# Patient Record
Sex: FEMALE | Race: ASIAN | NOT HISPANIC OR LATINO | URBAN - METROPOLITAN AREA
[De-identification: names, ages, dates, MRNs, and addresses within clinical notes are randomized per-mention and may not be internally consistent; named-entity substitution may affect disease eponyms.]

---

## 2018-05-17 ENCOUNTER — OFFICE VISIT (OUTPATIENT)
Dept: INTERNAL MEDICINE | Facility: CLINIC | Age: 28
End: 2018-05-17
Payer: COMMERCIAL

## 2018-05-17 VITALS
HEART RATE: 78 BPM | WEIGHT: 124.3 LBS | OXYGEN SATURATION: 98 % | RESPIRATION RATE: 16 BRPM | HEIGHT: 64 IN | BODY MASS INDEX: 21.22 KG/M2 | TEMPERATURE: 98.2 F | DIASTOLIC BLOOD PRESSURE: 60 MMHG | SYSTOLIC BLOOD PRESSURE: 100 MMHG

## 2018-05-17 DIAGNOSIS — S83.92XA SPRAIN OF LEFT KNEE, UNSPECIFIED LIGAMENT, INITIAL ENCOUNTER: Primary | ICD-10-CM

## 2018-05-17 PROCEDURE — 99213 OFFICE O/P EST LOW 20 MIN: CPT | Performed by: INTERNAL MEDICINE

## 2018-05-17 NOTE — MR AVS SNAPSHOT
"              After Visit Summary   2018    Kasie Ennis    MRN: [id number]           Patient Information     Date Of Birth          1990        Visit Information        Provider Department      2018 3:00 PM Saurabh Herzog MD [facility]        Today's Diagnoses     Sprain of left knee, unspecified ligament, initial encounter    -  1       Follow-ups after your visit        Who to contact     If you have questions or need follow up information about today's clinic visit or your schedule please contact [facility] directly at [phone].  Normal or non-critical lab and imaging results will be communicated to you by MyChart, letter or phone within 4 business days after the clinic has received the results. If you do not hear from us within 7 days, please contact the clinic through Cactushart or phone. If you have a critical or abnormal lab result, we will notify you by phone as soon as possible.  Submit refill requests through Wandera or call your pharmacy and they will forward the refill request to us. Please allow 3 business days for your refill to be completed.          Additional Information About Your Visit        MyChart Information     Wandera lets you send messages to your doctor, view your test results, renew your prescriptions, schedule appointments and more. To sign up, go to [email or web address] . Click on \"Log in\" on the left side of the screen, which will take you to the Welcome page. Then click on \"Sign up Now\" on the right side of the page.     You will be asked to enter the access code listed below, as well as some personal information. Please follow the directions to create your username and password.     Your access code is: [email or web address]  Expires: 8/15/2018  3:49 PM     Your access code will  in 90 days. If you need help or a new code, please call your [facility] or [phone].        Care EveryWhere ID     This is " "your Care EveryWhere ID. This could be used by other organizations to access your [facility] medical records  [id number]        Your Vitals Were     Pulse Temperature Respirations Height Last Period Pulse Oximetry    78 98.2  F (36.8  C) (Oral) 16 5' 4\" (1.626 m) 04/24/2018 (Approximate) 98%    Breastfeeding? BMI (Body Mass Index)                No 21.34 kg/m2           Blood Pressure from Last 3 Encounters:   05/17/18 100/60    Weight from Last 3 Encounters:   05/17/18 124 lb 4.8 oz (56.4 kg)              Today, you had the following     No orders found for display       Primary Care Provider Fax #    Physician No Ref-Primary [phone]       No address on file        Equal Access to Services     ANASTASIIA JIMENEZ : Rupert Herring, waaxda luqadaha, qaybta kaalmada adeegyada, darin hartmann . So [facility] [phone].    ATENCIÓN: Si habla español, tiene a hogan disposición servicios gratuitos de asistencia lingüística. Llame al [phone].    We comply with applicable federal civil rights laws and [place] laws. We do not discriminate on the basis of race, color, national origin, age, disability, sex, sexual orientation, or gender identity.            Thank you!     Thank you for choosing [facility]  for your care. Our goal is always to provide you with excellent care. Hearing back from our patients is one way we can continue to improve our services. Please take a few minutes to complete the written survey that you may receive in the mail after your visit with us. Thank you!             Your Updated Medication List - Protect others around you: Learn how to safely use, store and throw away your medicines at www.disposemymeds.org.      Notice  As of 5/17/2018  3:49 PM    You have not been prescribed any medications.      "

## 2018-05-17 NOTE — PROGRESS NOTES
"  SUBJECTIVE:   Kasie Ennis is a 27 year old female who presents to clinic today for the following health issues:      Musculoskeletal problem/pain      Duration: x6 days    Description  Location: Left knee    Intensity:  If resting no pain, when she moves her leg is mild, and walking/standing it is moderate    Accompanying signs and symptoms: feeling pressure in calve     History  Previous similar problem: no   Previous evaluation:  none    Precipitating or alleviating factors:  Trauma or overuse: no, Recently started running  Aggravating factors include:     Therapies tried and outcome: ice and knee elastic band with no relief          Problem list and histories reviewed & adjusted, as indicated.  Additional history:     Patient recently started running, has noticed intermittent left knee pain since then.  Pain seems lateral, and is worse after prolonged standing.  Pain is certainly not functionally debilitating, does not request any medication specifically.  She denies any swelling.    Reviewed and updated as needed this visit by clinical staff       Reviewed and updated as needed this visit by Provider         ROS:  Constitutional, HEENT, cardiovascular, pulmonary, GI, , musculoskeletal, neuro, skin, endocrine and psych systems are negative, except as otherwise noted.    OBJECTIVE:     /60  Pulse 78  Temp 98.2  F (36.8  C) (Oral)  Resp 16  Ht 5' 4\" (1.626 m)  Wt 124 lb 4.8 oz (56.4 kg)  LMP 04/24/2018 (Approximate)  SpO2 98%  Breastfeeding? No  BMI 21.34 kg/m2  Body mass index is 21.34 kg/(m^2).  GENERAL: healthy, alert and no distress  NECK: no adenopathy, no asymmetry, masses, or scars and thyroid normal to palpation  RESP: lungs clear to auscultation - no rales, rhonchi or wheezes  CV: regular rate and rhythm, normal S1 S2, no S3 or S4, no murmur, click or rub, no peripheral edema and peripheral pulses strong  ABDOMEN: soft, nontender, no hepatosplenomegaly, no masses and bowel sounds " normal  MS: Left knee reveals no laxity with stress in all 4 quadrants.  Negative Lachman's, negative Gaviota's.  Some point tenderness along lateral joint line.        ASSESSMENT/PLAN:     1. Sprain of left knee, unspecified ligament, initial encounter  No evidence of significant derangement on exam.  No need for x-ray today.  Reassurance, ibuprofen as needed, continue to avoid running for the next few weeks, use ice and support brace as needed.        Saurabh Herzog MD  [facility]

## 2019-02-05 ENCOUNTER — TELEPHONE (OUTPATIENT)
Dept: INTERNAL MEDICINE | Facility: CLINIC | Age: 29
End: 2019-02-05

## 2019-02-05 NOTE — TELEPHONE ENCOUNTER
Panel Management Review  [facility]    There is no problem list on file for this patient.        Patient is due/failing the following:   PREVENTION AND SCREENING       PAP  CARDIOVASCULAR and RENAL  DIABETES  DEPRESSION  ASTHMA  MIGRAINE  COPD  HEART FAILURE  CKD    Action needed:   Patient needs office visit with MD (no labs needed)    Type of outreach:    Sent letter.    Maddie Mendez

## 2019-02-05 NOTE — LETTER
[facility]  [address] [place] [address]  [phone]  February 5, 2019    Kasie Ennis  [address]   [place] [facility] [address]    Dear Kasie,    We care about your health and based on a review of your medical records, recommend the the following, to better manage your health:      You are in particular need of attention regarding:  -Cervical Cancer Screening    I am recommending that you:     -schedule a PAP SMEAR EXAM which is due.  Please disregard this reminder if you have had this exam elsewhere within the last year.  It would be helpful for us to have a copy of your recent pap smear report in our file so that we can best coordinate your care.    If you are under/uninsured, we recommend you contact the [facility]. They offer pap smears at no charge or on a sliding fee charge. You can schedule with them at [phone]. Please have them send us the results.      Here is a list of Health Maintenance topics that are due now or due soon:  Health Maintenance Due   Topic Date Due     HIV SCREEN (SYSTEM ASSIGNED)  08/30/2006     Pap Smear - every 3 years  08/30/2009     Diptheria Tetanus Pertussis (DTAP/TDAP/TD) Vaccine (1 - Tdap) 08/30/2013     Flu Vaccine (1) 09/01/2018       Please call us at [phone] or [phone] (or use [employer]) to address the above recommendations.     Thank you for trusting [facility].  We appreciate the opportunity to serve you and look forward to supporting your healthcare needs in the future.    If you have (or plan to have) any of these tests done at a facility other than a [facility] or a [facility], please have the results from these tests sent to your primary physician at [facility].    Healthy Regards,    Glen Herzog MD/Maddie Mendez [facility]

## 2020-01-06 ENCOUNTER — OFFICE VISIT (OUTPATIENT)
Dept: FAMILY MEDICINE | Facility: CLINIC | Age: 30
End: 2020-01-06
Payer: COMMERCIAL

## 2020-01-06 VITALS — TEMPERATURE: 98.7 F | SYSTOLIC BLOOD PRESSURE: 100 MMHG | DIASTOLIC BLOOD PRESSURE: 62 MMHG

## 2020-01-06 DIAGNOSIS — Z11.59 ENCOUNTER FOR SCREENING FOR OTHER VIRAL DISEASES: ICD-10-CM

## 2020-01-06 DIAGNOSIS — Z71.84 TRAVEL ADVICE ENCOUNTER: Primary | ICD-10-CM

## 2020-01-06 LAB
ABO + RH BLD: NORMAL
ABO + RH BLD: NORMAL
SPECIMEN EXP DATE BLD: NORMAL

## 2020-01-06 PROCEDURE — 86900 BLOOD TYPING SEROLOGIC ABO: CPT | Mod: GA | Performed by: NURSE PRACTITIONER

## 2020-01-06 PROCEDURE — 90686 IIV4 VACC NO PRSV 0.5 ML IM: CPT | Performed by: NURSE PRACTITIONER

## 2020-01-06 PROCEDURE — 90691 TYPHOID VACCINE IM: CPT | Mod: GA | Performed by: NURSE PRACTITIONER

## 2020-01-06 PROCEDURE — 86787 VARICELLA-ZOSTER ANTIBODY: CPT | Mod: GA | Performed by: NURSE PRACTITIONER

## 2020-01-06 PROCEDURE — 86735 MUMPS ANTIBODY: CPT | Mod: GA | Performed by: NURSE PRACTITIONER

## 2020-01-06 PROCEDURE — 87340 HEPATITIS B SURFACE AG IA: CPT | Mod: GA | Performed by: NURSE PRACTITIONER

## 2020-01-06 PROCEDURE — 99403 PREV MED CNSL INDIV APPRX 45: CPT | Mod: 25 | Performed by: NURSE PRACTITIONER

## 2020-01-06 PROCEDURE — 90632 HEPA VACCINE ADULT IM: CPT | Mod: GA | Performed by: NURSE PRACTITIONER

## 2020-01-06 PROCEDURE — 86765 RUBEOLA ANTIBODY: CPT | Mod: GA | Performed by: NURSE PRACTITIONER

## 2020-01-06 PROCEDURE — 90471 IMMUNIZATION ADMIN: CPT | Performed by: NURSE PRACTITIONER

## 2020-01-06 PROCEDURE — 90715 TDAP VACCINE 7 YRS/> IM: CPT | Mod: GA | Performed by: NURSE PRACTITIONER

## 2020-01-06 PROCEDURE — 90472 IMMUNIZATION ADMIN EACH ADD: CPT | Mod: GA | Performed by: NURSE PRACTITIONER

## 2020-01-06 PROCEDURE — 86901 BLOOD TYPING SEROLOGIC RH(D): CPT | Mod: GA | Performed by: NURSE PRACTITIONER

## 2020-01-06 PROCEDURE — 36415 COLL VENOUS BLD VENIPUNCTURE: CPT | Mod: GA | Performed by: NURSE PRACTITIONER

## 2020-01-06 PROCEDURE — 86762 RUBELLA ANTIBODY: CPT | Mod: GA | Performed by: NURSE PRACTITIONER

## 2020-01-06 PROCEDURE — 86706 HEP B SURFACE ANTIBODY: CPT | Mod: GA | Performed by: NURSE PRACTITIONER

## 2020-01-06 RX ORDER — ACETAZOLAMIDE 125 MG/1
TABLET ORAL
Qty: 12 TABLET | Refills: 0 | Status: ON HOLD | OUTPATIENT
Start: 2020-01-06 | End: 2023-02-17

## 2020-01-06 NOTE — PROGRESS NOTES
Nurse Note      Itinerary:  [place]      Departure Date: 1/10/20      Return Date: 1/18/20      Length of Trip 8 days      Reason for Travel: Tourism           Urban or rural: both      Accommodations: Hotel        IMMUNIZATION HISTORY  Have you received any immunizations within the past 4 weeks?  No  Have you ever fainted from having your blood drawn or from an injection?  No  Have you ever had a fever reaction to vaccination?  No  Have you ever had any bad reaction or side effect from any vaccination?  No  Have you ever had hepatitis A or B vaccine?  unsure  Do you live (or work closely) with anyone who has AIDS, an AIDS-like condition, any other immune disorder or who is on chemotherapy for cancer?  No  Do you have a family history of immunodeficiency?  No  Have you received any injection of immune globulin or any blood products during the past 12 months?  No    Patient roomed by Travis Lee  Kasie Ennis is a 29 year old female seen today alone for counsultation for international travel to the stated countries.   Patient will be departing in  3 day(s) and  traveling with spouse.      Patient itinerary :  [place] [facility] [place] [facility] > [place] ( 10,000 ft )> [facility]  region  which presents risk for food borne illnesses and motor vehicle accidents. exposure.      Patient's activities will include sightseeing, hiking and high altitude exposure.    Patient[place] country of birth is [place] and to [place] in [place]    Special medical concerns: low HGB  Pre-travel questionnaire was completed by patient and reviewed by provider.     Vitals: /62 (BP Location: Left arm)   Temp 98.7  F (37.1  C) (Oral)   LMP 12/28/2019   BMI= There is no height or weight on file to calculate BMI.    EXAM:  General:  Well-nourished, well-developed in no acute distress.  Appears to be stated age, interacts appropriately and expresses understanding of information given to  patient.    No current outpatient medications on file.     There is no problem list on file for this patient.    No Known Allergies      Immunizations discussed include:   Hepatitis A:  Ordered/given today, risks, benefits and side effects reviewed  Hepatitis B: Titers drawn  Influenza: Ordered/given today, risks, benefits and side effects reviewed  Typhoid: Ordered/given today, risks, benefits and side effects reviewed  Rabies: Declined  reviewed managment of a animal bite or scratch (washing wound, seek medical care within 24 hours for post exposure prophylaxis ) and Insufficient time to vaccinate  Yellow Fever: Not indicated  Japanese Encephalitis: Not indicated  Meningococcus: Not indicated  Tetanus/Diphtheria: Ordered/given today, risks, benefits and side effects reviewed  Measles/Mumps/Rubella: Titers drawn  Cholera: Not needed  Polio: Up to date  Pneumococcal: Under age of 65  Varicella: Titers drawn  Zostavax:  Not indicated  Shingrix: Not indicated  HPV:  Not indicated  TB:  Low risk     Altitude Exposure on this trip: no  Past tolerance to Altitude: na    ASSESSMENT/PLAN:    ICD-10-CM    1. Travel advice encounter Z71.84 acetaZOLAMIDE (DIAMOX) 125 MG tablet     ABO and Rh   2. Encounter for screening for other viral diseases Z11.59 Hepatitis B Surface Antibody     Hepatitis B surface antigen     Rubeola Antibody IgG     Rubella Antibody IgG Quantitative     Mumps Immune Status, IgG     Varicella Zoster Virus Antibody IgG     Mumps Immune Status, IgG     I have reviewed general recommendations for safe travel   including: food/water precautions, insect precautions, safer sex   practices given high prevalence of Zika, HIV and other STDs,   roadway safety. Educational materials and [employer] report provided.    Malaraia prophylaxis recommended: none  Symptomatic treatment for traveler's diarrhea: azithromycin  Altitude illness prevention and treatment: Diamox prescription given with instructions on use and  education provided on Acute altitude illness recognition and prevention.    Advised patient to begin multivitiamins with Fe. Has follow up planned for HGB       Evacuation insurance advised and resources were provided to patient.    Total visit time 45 minutes  with over 50% of time spent counseling patient as detailed above.    Ynes Guadalupe CNP

## 2020-01-06 NOTE — NURSING NOTE
"Chief Complaint   Patient presents with     [facility]     initial /62 (BP Location: Left arm)   Temp 98.7  F (37.1  C) (Oral)   LMP 12/28/2019  Estimated body mass index is 21.34 kg/m  as calculated from the following:    Height as of 5/17/18: 1.626 m (5' 4\").    Weight as of 5/17/18: 56.4 kg (124 lb 4.8 oz).  BP completed using cuff size: regular.  L  arm      Health Maintenance that is potentially due pending provider review:  NONE    n/a    Travis Freedman ma  "

## 2020-01-06 NOTE — PATIENT INSTRUCTIONS
Today January 6, 2020 you received the    Flu Vaccine    Hepatitis A Vaccine - Please return on 7/4/20 or later for your 2nd and final dose.    Tetanus (Tdap) Vaccine    Typhoid - injectable. This vaccine is valid for two years.   .    These appointments can be made as a NURSE ONLY visit.    **It is very important for the vaccinations to be given on the scheduled day(s), this helps ensure you receive the full effectiveness of the vaccine.**    Please call [facility] with any questions [phone]    Thank you for visiting [facility]

## 2020-01-07 LAB
HBV SURFACE AB SERPL IA-ACNC: 263.63 M[IU]/ML
HBV SURFACE AG SERPL QL IA: NONREACTIVE
MEV IGG SER QL IA: 3.9 AI (ref 0–0.8)
MUV IGG SER QL IA: 0.3 AI (ref 0–0.8)
RUBV IGG SERPL IA-ACNC: 9 IU/ML
VZV IGG SER QL IA: <0.2 AI (ref 0–0.8)

## 2020-01-08 DIAGNOSIS — Z23 NEED FOR VARICELLA VACCINE: ICD-10-CM

## 2020-01-08 DIAGNOSIS — Z23 NEED FOR MMR VACCINE: Primary | ICD-10-CM

## 2020-01-09 ENCOUNTER — ALLIED HEALTH/NURSE VISIT (OUTPATIENT)
Dept: NURSING | Facility: CLINIC | Age: 30
End: 2020-01-09
Payer: COMMERCIAL

## 2020-01-09 DIAGNOSIS — Z23 NEED FOR VACCINATION: Primary | ICD-10-CM

## 2020-01-09 PROCEDURE — 90716 VAR VACCINE LIVE SUBQ: CPT

## 2020-01-09 PROCEDURE — 90707 MMR VACCINE SC: CPT

## 2020-01-09 PROCEDURE — 90472 IMMUNIZATION ADMIN EACH ADD: CPT

## 2020-01-09 PROCEDURE — 99207 ZZC NO CHARGE LOS: CPT

## 2020-01-09 PROCEDURE — 90471 IMMUNIZATION ADMIN: CPT

## 2020-01-09 NOTE — NURSING NOTE
Prior to immunization administration, verified patients identity using patient s name and date of birth. Please see Immunization Activity for additional information.     Screening Questionnaire for Adult Immunization    Are you sick today?   No   Do you have allergies to medications, food, a vaccine component or latex?   No   Have you ever had a serious reaction after receiving a vaccination?   No   Do you have a long-term health problem with heart, lung, kidney, or metabolic disease (e.g., diabetes), asthma, a blood disorder, no spleen, complement component deficiency, a cochlear implant, or a spinal fluid leak?  Are you on long-term aspirin therapy?   No   Do you have cancer, leukemia, HIV/AIDS, or any other immune system problem?   No   Do you have a parent, brother, or sister with an immune system problem?   No   In the past 3 months, have you taken medications that affect  your immune system, such as prednisone, other steroids, or anticancer drugs; drugs for the treatment of rheumatoid arthritis, Crohn s disease, or psoriasis; or have you had radiation treatments?   No   Have you had a seizure, or a brain or other nervous system problem?   No   During the past year, have you received a transfusion of blood or blood    products, or been given immune (gamma) globulin or antiviral drug?   No   For women: Are you pregnant or is there a chance you could become       pregnant during the next month?   No   Have you received any vaccinations in the past 4 weeks?   Yes     Immunization questionnaire was positive for at least one answer.  Notified Ynes Guadalupe CNP.        Per orders of Ynes Guadalupe CNP, injection of MMR and Varicella given by Maddie Aleman MA. Patient instructed to remain in clinic for 15 minutes afterwards, and to report any adverse reaction to me immediately.  Patient verified.     Screening performed by Maddie Aleman MA on 1/9/2020 at 4:29 PM.

## 2022-09-06 ENCOUNTER — LAB REQUISITION (OUTPATIENT)
Dept: LAB | Facility: CLINIC | Age: 32
End: 2022-09-06
Payer: COMMERCIAL

## 2022-09-06 DIAGNOSIS — Z3A.16 16 WEEKS GESTATION OF PREGNANCY: ICD-10-CM

## 2022-09-06 PROCEDURE — 82105 ALPHA-FETOPROTEIN SERUM: CPT | Mod: ORL | Performed by: OBSTETRICS & GYNECOLOGY

## 2022-09-07 LAB
# FETUSES US: NORMAL
AFP MOM SERPL: 1.06
AFP SERPL-MCNC: 41 NG/ML
AGE - REPORTED: 34.5 YR
CURRENT SMOKER: NO
FAMILY MEMBER DISEASES HX: NO
GA METHOD: NORMAL
GA: NORMAL WK
IDDM PATIENT QL: NO
INTEGRATED SCN PATIENT-IMP: NORMAL
SPECIMEN DRAWN SERPL: NORMAL

## 2022-11-22 ENCOUNTER — LAB REQUISITION (OUTPATIENT)
Dept: LAB | Facility: CLINIC | Age: 32
End: 2022-11-22
Payer: COMMERCIAL

## 2022-11-22 ENCOUNTER — LAB REQUISITION (OUTPATIENT)
Dept: LAB | Facility: CLINIC | Age: 32
End: 2022-11-22

## 2022-11-22 DIAGNOSIS — Z36.89 ENCOUNTER FOR OTHER SPECIFIED ANTENATAL SCREENING: ICD-10-CM

## 2022-11-22 LAB
HEPATITIS B SURFACE ANTIGEN (EXTERNAL): NEGATIVE
HGB BLD-MCNC: 11.7 G/DL (ref 11.7–15.7)
HIV1+2 AB SERPL QL IA: NEGATIVE
RUBELLA ANTIBODY IGG (EXTERNAL): NORMAL
TREPONEMA PALLIDUM ANTIBODY (EXTERNAL): NONREACTIVE

## 2022-11-22 PROCEDURE — 86780 TREPONEMA PALLIDUM: CPT | Performed by: OBSTETRICS & GYNECOLOGY

## 2022-11-22 PROCEDURE — 85018 HEMOGLOBIN: CPT | Mod: ORL | Performed by: OBSTETRICS & GYNECOLOGY

## 2022-11-22 PROCEDURE — 82950 GLUCOSE TEST: CPT | Mod: ORL | Performed by: OBSTETRICS & GYNECOLOGY

## 2022-11-23 LAB
GLUCOSE 1H P 50 G GLC PO SERPL-MCNC: 99 MG/DL (ref 70–129)
T PALLIDUM AB SER QL: NONREACTIVE

## 2023-01-14 ENCOUNTER — HOSPITAL ENCOUNTER (OUTPATIENT)
Facility: CLINIC | Age: 33
End: 2023-01-14
Admitting: OBSTETRICS & GYNECOLOGY
Payer: COMMERCIAL

## 2023-01-14 ENCOUNTER — HOSPITAL ENCOUNTER (OUTPATIENT)
Facility: CLINIC | Age: 33
Discharge: HOME | End: 2023-01-14
Attending: OBSTETRICS & GYNECOLOGY | Admitting: OBSTETRICS & GYNECOLOGY
Payer: COMMERCIAL

## 2023-01-14 VITALS
SYSTOLIC BLOOD PRESSURE: 102 MMHG | TEMPERATURE: 98 F | RESPIRATION RATE: 16 BRPM | DIASTOLIC BLOOD PRESSURE: 60 MMHG | HEART RATE: 76 BPM

## 2023-01-14 PROCEDURE — 59025 FETAL NON-STRESS TEST: CPT

## 2023-01-14 PROCEDURE — G0463 HOSPITAL OUTPT CLINIC VISIT: HCPCS | Mod: 25

## 2023-01-14 RX ORDER — PROCHLORPERAZINE 25 MG
25 SUPPOSITORY, RECTAL RECTAL EVERY 12 HOURS PRN
Status: DISCONTINUED | OUTPATIENT
Start: 2023-01-14 | End: 2023-01-14 | Stop reason: HOSPADM

## 2023-01-14 RX ORDER — METOCLOPRAMIDE HYDROCHLORIDE 5 MG/ML
10 INJECTION INTRAMUSCULAR; INTRAVENOUS EVERY 6 HOURS PRN
Status: DISCONTINUED | OUTPATIENT
Start: 2023-01-14 | End: 2023-01-14 | Stop reason: HOSPADM

## 2023-01-14 RX ORDER — PROCHLORPERAZINE MALEATE 5 MG
10 TABLET ORAL EVERY 6 HOURS PRN
Status: DISCONTINUED | OUTPATIENT
Start: 2023-01-14 | End: 2023-01-14 | Stop reason: HOSPADM

## 2023-01-14 RX ORDER — ONDANSETRON 4 MG/1
4 TABLET, ORALLY DISINTEGRATING ORAL EVERY 6 HOURS PRN
Status: DISCONTINUED | OUTPATIENT
Start: 2023-01-14 | End: 2023-01-14 | Stop reason: HOSPADM

## 2023-01-14 RX ORDER — ONDANSETRON 2 MG/ML
4 INJECTION INTRAMUSCULAR; INTRAVENOUS EVERY 6 HOURS PRN
Status: DISCONTINUED | OUTPATIENT
Start: 2023-01-14 | End: 2023-01-14 | Stop reason: HOSPADM

## 2023-01-14 RX ORDER — METOCLOPRAMIDE 10 MG/1
10 TABLET ORAL EVERY 6 HOURS PRN
Status: DISCONTINUED | OUTPATIENT
Start: 2023-01-14 | End: 2023-01-14 | Stop reason: HOSPADM

## 2023-01-14 RX ORDER — VITAMIN A ACETATE, .BETA.-CAROTENE, ASCORBIC ACID, CHOLECALCIFEROL, .ALPHA.-TOCOPHEROL ACETATE, DL-, THIAMINE MONONITRATE, RIBOFLAVIN, NIACINAMIDE, PYRIDOXINE HYDROCHLORIDE, FOLIC ACID, CYANOCOBALAMIN, CALCIUM CARBONATE, FERROUS FUMARATE, ZINC OXIDE, AND CUPRIC OXIDE 2000; 2000; 120; 400; 22; 1.84; 3; 20; 10; 1; 12; 200; 27; 25; 2 [IU]/1; [IU]/1; MG/1; [IU]/1; MG/1; MG/1; MG/1; MG/1; MG/1; MG/1; UG/1; MG/1; MG/1; MG/1; MG/1
1 TABLET ORAL DAILY
COMMUNITY

## 2023-01-14 ASSESSMENT — ACTIVITIES OF DAILY LIVING (ADL): ADLS_ACUITY_SCORE: 18

## 2023-01-14 NOTE — PLAN OF CARE
Pt denies any further bleeding, pt not feeling ctx, + fetal movement. Category 1 tracing.  Dr Anne consulted on pt.  Orders given to discharge patient.

## 2023-01-14 NOTE — PLAN OF CARE
Pt presents to maternal assessment center with complaints of annabel sized blood spot in her underwear.  EFM placed with verbal consent.  Pt denies leaking of fluid.  Pt states baby is moving.  Pt denies feeling ctx.   present and supportive at bedside.

## 2023-01-14 NOTE — DISCHARGE INSTRUCTIONS
Discharge Instruction for Undelivered Patients      You were seen for: Bleeding Assessment  We Consulted:Dr Altman  You had (Test or Medicine):non stress test     Diet:   Drink 8 to 12 glasses of liquids (milk, juice, water) every day.  You may eat meals and snacks.     Activity:  Count fetal kicks everyday (see handout)  Call your doctor or nurse midwife if your baby is moving less than usual.     Call your provider if you notice:  Swelling in your face or increased swelling in your hands or legs.  Headaches that are not relieved by Tylenol (acetaminophen).  Changes in your vision (blurring: seeing spots or stars.)  Nausea (sick to your stomach) and vomiting (throwing up).   Weight gain of 5 pounds or more per week.  Heartburn that doesn't go away.  Signs of bladder infection: pain when you urinate (use the toilet), need to go more often and more urgently.  The bag of greenberg (rupture of membranes) breaks, or you notice leaking in your underwear.  Bright red blood in your underwear.  Abdominal (lower belly) or stomach pain.  For first baby: Contractions (tightening) less than 5 minutes apart for one hour or more.  *If less than 34 weeks: Contractions (tightening) more than 6 times in one hour.  Increase or change in vaginal discharge (note the color and amount)  Other:    Follow-up:  As scheduled in the clinic

## 2023-01-14 NOTE — PLAN OF CARE
Discharge teaching given to pt and spouse, all questions answered.  Pt verbalized understanding.  Pt to follow up in clinic as directed.  Pt gathered belonging and discharged ambulatory.

## 2023-01-24 ENCOUNTER — LAB REQUISITION (OUTPATIENT)
Dept: LAB | Facility: CLINIC | Age: 33
End: 2023-01-24

## 2023-01-24 DIAGNOSIS — Z3A.36 36 WEEKS GESTATION OF PREGNANCY: ICD-10-CM

## 2023-01-24 PROCEDURE — 87653 STREP B DNA AMP PROBE: CPT | Performed by: OBSTETRICS & GYNECOLOGY

## 2023-01-24 PROCEDURE — 87077 CULTURE AEROBIC IDENTIFY: CPT | Performed by: OBSTETRICS & GYNECOLOGY

## 2023-01-25 LAB — GP B STREP DNA SPEC QL NAA+PROBE: POSITIVE

## 2023-01-28 ENCOUNTER — HEALTH MAINTENANCE LETTER (OUTPATIENT)
Age: 33
End: 2023-01-28

## 2023-01-29 LAB — BACTERIA SPEC CULT: ABNORMAL

## 2023-02-17 ENCOUNTER — HOSPITAL ENCOUNTER (INPATIENT)
Facility: CLINIC | Age: 33
LOS: 3 days | Discharge: ANOTHER HOSPITAL | End: 2023-02-20
Attending: OBSTETRICS & GYNECOLOGY | Admitting: OBSTETRICS & GYNECOLOGY
Payer: COMMERCIAL

## 2023-02-17 LAB
ABO/RH(D): NORMAL
ANTIBODY SCREEN: NEGATIVE
HGB BLD-MCNC: 12.8 G/DL (ref 11.7–15.7)
HOLD SPECIMEN: NORMAL
HOLD SPECIMEN: NORMAL
RUPTURE OF FETAL MEMBRANES BY ROM PLUS: POSITIVE
SPECIMEN EXPIRATION DATE: NORMAL

## 2023-02-17 PROCEDURE — 250N000013 HC RX MED GY IP 250 OP 250 PS 637: Performed by: OBSTETRICS & GYNECOLOGY

## 2023-02-17 PROCEDURE — 120N000001 HC R&B MED SURG/OB

## 2023-02-17 PROCEDURE — 258N000003 HC RX IP 258 OP 636: Performed by: OBSTETRICS & GYNECOLOGY

## 2023-02-17 PROCEDURE — 85018 HEMOGLOBIN: CPT | Performed by: OBSTETRICS & GYNECOLOGY

## 2023-02-17 PROCEDURE — 86850 RBC ANTIBODY SCREEN: CPT | Performed by: OBSTETRICS & GYNECOLOGY

## 2023-02-17 PROCEDURE — 250N000011 HC RX IP 250 OP 636: Performed by: OBSTETRICS & GYNECOLOGY

## 2023-02-17 PROCEDURE — 84112 EVAL AMNIOTIC FLUID PROTEIN: CPT | Performed by: OBSTETRICS & GYNECOLOGY

## 2023-02-17 PROCEDURE — 86780 TREPONEMA PALLIDUM: CPT | Performed by: OBSTETRICS & GYNECOLOGY

## 2023-02-17 PROCEDURE — G0463 HOSPITAL OUTPT CLINIC VISIT: HCPCS

## 2023-02-17 PROCEDURE — 36415 COLL VENOUS BLD VENIPUNCTURE: CPT | Performed by: OBSTETRICS & GYNECOLOGY

## 2023-02-17 PROCEDURE — 86901 BLOOD TYPING SEROLOGIC RH(D): CPT | Performed by: OBSTETRICS & GYNECOLOGY

## 2023-02-17 RX ORDER — IBUPROFEN 400 MG/1
800 TABLET, FILM COATED ORAL
Status: DISCONTINUED | OUTPATIENT
Start: 2023-02-17 | End: 2023-02-20 | Stop reason: HOSPADM

## 2023-02-17 RX ORDER — ONDANSETRON 2 MG/ML
4 INJECTION INTRAMUSCULAR; INTRAVENOUS EVERY 6 HOURS PRN
Status: DISCONTINUED | OUTPATIENT
Start: 2023-02-17 | End: 2023-02-20 | Stop reason: HOSPADM

## 2023-02-17 RX ORDER — KETOROLAC TROMETHAMINE 30 MG/ML
30 INJECTION, SOLUTION INTRAMUSCULAR; INTRAVENOUS
Status: DISCONTINUED | OUTPATIENT
Start: 2023-02-17 | End: 2023-02-20 | Stop reason: HOSPADM

## 2023-02-17 RX ORDER — CARBOPROST TROMETHAMINE 250 UG/ML
250 INJECTION, SOLUTION INTRAMUSCULAR
Status: DISCONTINUED | OUTPATIENT
Start: 2023-02-17 | End: 2023-02-20 | Stop reason: HOSPADM

## 2023-02-17 RX ORDER — ACETAMINOPHEN 325 MG/1
650 TABLET ORAL EVERY 4 HOURS PRN
Status: DISCONTINUED | OUTPATIENT
Start: 2023-02-17 | End: 2023-02-20 | Stop reason: HOSPADM

## 2023-02-17 RX ORDER — METHYLERGONOVINE MALEATE 0.2 MG/ML
200 INJECTION INTRAVENOUS
Status: DISCONTINUED | OUTPATIENT
Start: 2023-02-17 | End: 2023-02-20 | Stop reason: HOSPADM

## 2023-02-17 RX ORDER — OXYTOCIN/0.9 % SODIUM CHLORIDE 30/500 ML
340 PLASTIC BAG, INJECTION (ML) INTRAVENOUS CONTINUOUS PRN
Status: DISCONTINUED | OUTPATIENT
Start: 2023-02-17 | End: 2023-02-20 | Stop reason: HOSPADM

## 2023-02-17 RX ORDER — OXYTOCIN 10 [USP'U]/ML
10 INJECTION, SOLUTION INTRAMUSCULAR; INTRAVENOUS
Status: DISCONTINUED | OUTPATIENT
Start: 2023-02-17 | End: 2023-02-20 | Stop reason: HOSPADM

## 2023-02-17 RX ORDER — MISOPROSTOL 200 UG/1
800 TABLET ORAL
Status: DISCONTINUED | OUTPATIENT
Start: 2023-02-17 | End: 2023-02-20 | Stop reason: HOSPADM

## 2023-02-17 RX ORDER — MISOPROSTOL 200 UG/1
400 TABLET ORAL
Status: DISCONTINUED | OUTPATIENT
Start: 2023-02-17 | End: 2023-02-20 | Stop reason: HOSPADM

## 2023-02-17 RX ORDER — METOCLOPRAMIDE 10 MG/1
10 TABLET ORAL EVERY 6 HOURS PRN
Status: DISCONTINUED | OUTPATIENT
Start: 2023-02-17 | End: 2023-02-17 | Stop reason: HOSPADM

## 2023-02-17 RX ORDER — ONDANSETRON 4 MG/1
4 TABLET, ORALLY DISINTEGRATING ORAL EVERY 6 HOURS PRN
Status: DISCONTINUED | OUTPATIENT
Start: 2023-02-17 | End: 2023-02-20 | Stop reason: HOSPADM

## 2023-02-17 RX ORDER — METOCLOPRAMIDE HYDROCHLORIDE 5 MG/ML
10 INJECTION INTRAMUSCULAR; INTRAVENOUS EVERY 6 HOURS PRN
Status: DISCONTINUED | OUTPATIENT
Start: 2023-02-17 | End: 2023-02-20 | Stop reason: HOSPADM

## 2023-02-17 RX ORDER — NALOXONE HYDROCHLORIDE 0.4 MG/ML
0.4 INJECTION, SOLUTION INTRAMUSCULAR; INTRAVENOUS; SUBCUTANEOUS
Status: DISCONTINUED | OUTPATIENT
Start: 2023-02-17 | End: 2023-02-20 | Stop reason: HOSPADM

## 2023-02-17 RX ORDER — METOCLOPRAMIDE 10 MG/1
10 TABLET ORAL EVERY 6 HOURS PRN
Status: DISCONTINUED | OUTPATIENT
Start: 2023-02-17 | End: 2023-02-20 | Stop reason: HOSPADM

## 2023-02-17 RX ORDER — MISOPROSTOL 100 UG/1
25 TABLET ORAL
Status: COMPLETED | OUTPATIENT
Start: 2023-02-17 | End: 2023-02-18

## 2023-02-17 RX ORDER — FENTANYL CITRATE 50 UG/ML
100 INJECTION, SOLUTION INTRAMUSCULAR; INTRAVENOUS
Status: DISCONTINUED | OUTPATIENT
Start: 2023-02-17 | End: 2023-02-20 | Stop reason: HOSPADM

## 2023-02-17 RX ORDER — ONDANSETRON 4 MG/1
4 TABLET, ORALLY DISINTEGRATING ORAL EVERY 6 HOURS PRN
Status: DISCONTINUED | OUTPATIENT
Start: 2023-02-17 | End: 2023-02-17 | Stop reason: HOSPADM

## 2023-02-17 RX ORDER — SODIUM CHLORIDE, SODIUM LACTATE, POTASSIUM CHLORIDE, CALCIUM CHLORIDE 600; 310; 30; 20 MG/100ML; MG/100ML; MG/100ML; MG/100ML
INJECTION, SOLUTION INTRAVENOUS CONTINUOUS
Status: DISCONTINUED | OUTPATIENT
Start: 2023-02-17 | End: 2023-02-20 | Stop reason: HOSPADM

## 2023-02-17 RX ORDER — PROCHLORPERAZINE 25 MG
25 SUPPOSITORY, RECTAL RECTAL EVERY 12 HOURS PRN
Status: DISCONTINUED | OUTPATIENT
Start: 2023-02-17 | End: 2023-02-17 | Stop reason: HOSPADM

## 2023-02-17 RX ORDER — CITRIC ACID/SODIUM CITRATE 334-500MG
30 SOLUTION, ORAL ORAL
Status: DISCONTINUED | OUTPATIENT
Start: 2023-02-17 | End: 2023-02-20 | Stop reason: HOSPADM

## 2023-02-17 RX ORDER — NALOXONE HYDROCHLORIDE 0.4 MG/ML
0.2 INJECTION, SOLUTION INTRAMUSCULAR; INTRAVENOUS; SUBCUTANEOUS
Status: DISCONTINUED | OUTPATIENT
Start: 2023-02-17 | End: 2023-02-20 | Stop reason: HOSPADM

## 2023-02-17 RX ORDER — PROCHLORPERAZINE MALEATE 5 MG
10 TABLET ORAL EVERY 6 HOURS PRN
Status: DISCONTINUED | OUTPATIENT
Start: 2023-02-17 | End: 2023-02-20 | Stop reason: HOSPADM

## 2023-02-17 RX ORDER — PENICILLIN G POTASSIUM 5000000 [IU]/1
5 INJECTION, POWDER, FOR SOLUTION INTRAMUSCULAR; INTRAVENOUS ONCE
Status: COMPLETED | OUTPATIENT
Start: 2023-02-17 | End: 2023-02-17

## 2023-02-17 RX ORDER — PROCHLORPERAZINE MALEATE 5 MG
10 TABLET ORAL EVERY 6 HOURS PRN
Status: DISCONTINUED | OUTPATIENT
Start: 2023-02-17 | End: 2023-02-17 | Stop reason: HOSPADM

## 2023-02-17 RX ORDER — ONDANSETRON 2 MG/ML
4 INJECTION INTRAMUSCULAR; INTRAVENOUS EVERY 6 HOURS PRN
Status: DISCONTINUED | OUTPATIENT
Start: 2023-02-17 | End: 2023-02-17 | Stop reason: HOSPADM

## 2023-02-17 RX ORDER — OXYTOCIN/0.9 % SODIUM CHLORIDE 30/500 ML
100-340 PLASTIC BAG, INJECTION (ML) INTRAVENOUS CONTINUOUS PRN
Status: DISCONTINUED | OUTPATIENT
Start: 2023-02-17 | End: 2023-02-20 | Stop reason: HOSPADM

## 2023-02-17 RX ORDER — TRANEXAMIC ACID 10 MG/ML
1 INJECTION, SOLUTION INTRAVENOUS EVERY 30 MIN PRN
Status: DISCONTINUED | OUTPATIENT
Start: 2023-02-17 | End: 2023-02-20 | Stop reason: HOSPADM

## 2023-02-17 RX ORDER — PENICILLIN G 3000000 [IU]/50ML
3 INJECTION, SOLUTION INTRAVENOUS EVERY 4 HOURS
Status: DISCONTINUED | OUTPATIENT
Start: 2023-02-17 | End: 2023-02-20 | Stop reason: HOSPADM

## 2023-02-17 RX ORDER — PROCHLORPERAZINE 25 MG
25 SUPPOSITORY, RECTAL RECTAL EVERY 12 HOURS PRN
Status: DISCONTINUED | OUTPATIENT
Start: 2023-02-17 | End: 2023-02-20 | Stop reason: HOSPADM

## 2023-02-17 RX ORDER — METOCLOPRAMIDE HYDROCHLORIDE 5 MG/ML
10 INJECTION INTRAMUSCULAR; INTRAVENOUS EVERY 6 HOURS PRN
Status: DISCONTINUED | OUTPATIENT
Start: 2023-02-17 | End: 2023-02-17 | Stop reason: HOSPADM

## 2023-02-17 RX ADMIN — PENICILLIN G 3 MILLION UNITS: 3000000 INJECTION, SOLUTION INTRAVENOUS at 21:37

## 2023-02-17 RX ADMIN — MISOPROSTOL 25 MCG: 100 TABLET ORAL at 21:37

## 2023-02-17 RX ADMIN — SODIUM CHLORIDE, POTASSIUM CHLORIDE, SODIUM LACTATE AND CALCIUM CHLORIDE: 600; 310; 30; 20 INJECTION, SOLUTION INTRAVENOUS at 17:23

## 2023-02-17 RX ADMIN — MISOPROSTOL 25 MCG: 100 TABLET ORAL at 17:19

## 2023-02-17 RX ADMIN — MISOPROSTOL 25 MCG: 100 TABLET ORAL at 19:23

## 2023-02-17 RX ADMIN — MISOPROSTOL 25 MCG: 100 TABLET ORAL at 23:23

## 2023-02-17 RX ADMIN — PENICILLIN G POTASSIUM 5 MILLION UNITS: 5000000 POWDER, FOR SOLUTION INTRAMUSCULAR; INTRAPLEURAL; INTRATHECAL; INTRAVENOUS at 17:29

## 2023-02-17 ASSESSMENT — ACTIVITIES OF DAILY LIVING (ADL)
ADLS_ACUITY_SCORE: 18
DIFFICULTY_EATING/SWALLOWING: NO
ADLS_ACUITY_SCORE: 31
TOILETING_ISSUES: NO
WALKING_OR_CLIMBING_STAIRS_DIFFICULTY: NO
CONCENTRATING,_REMEMBERING_OR_MAKING_DECISIONS_DIFFICULTY: NO
ADLS_ACUITY_SCORE: 18
FALL_HISTORY_WITHIN_LAST_SIX_MONTHS: NO
DRESSING/BATHING_DIFFICULTY: NO
CHANGE_IN_FUNCTIONAL_STATUS_SINCE_ONSET_OF_CURRENT_ILLNESS/INJURY: NO
ADLS_ACUITY_SCORE: 18
WEAR_GLASSES_OR_BLIND: NO
ADLS_ACUITY_SCORE: 18

## 2023-02-17 NOTE — H&P
"OB Brief Admit H&P    No significant change in general health status based on examination of the patient, review of Nursing Admission Database and prenatal record.    Pt is a 32 year old  @ 39w5d who presented to L&D with leakage of fluid. Small amount of fluid gush at 0200, and then sporadically throughout the day. No cramping or contractions.    Patient's prenatal course has been complicated by   Transfer of care at 12 weeks  +BV x 2 end of pregnancy - both treated  *Cervical polyp noted at NOB at outside clinic*    Prenatal Labs:    AB pos, ABS neg, GC neg, Hep B neg, Hep C neg, 4.9 A1C, HIV neg, RI, Treponema NR, Pap wnl, Urine Culture neg  1 hour GTT normal  GBS positive    EFW: 7.5 lbs    /64   Temp 98.4  F (36.9  C) (Temporal)   Resp 16   Ht 1.626 m (5' 4\")   Wt 69.4 kg (153 lb)   BMI 26.26 kg/m    EFM:  115, moderate variability, ++accels, no decels (Category 1)  [place]: none  SVE: 0/60%/-2  Membranes:  Ruptured, clear at 0200 (+ROM plus)    Assessment:  32 year old  @ 39w5d admitted for PROM. GBS positive.    Plan:  1. Admit to labor and delivery   2. PROM  - Oral cytotec for ripening  - Pitocin when appropriate  - Ok for epidural  - Anticipate   3. GBS positive  - Start PCN ppx.  4. S/p Tdap and flu shot this pregnancy    Aaliyah Osman MD  2023  5:05 PM      "

## 2023-02-17 NOTE — PLAN OF CARE
Data: Patient presented to [facility] at 1430.   Reason for maternal/fetal assessment per patient is Rule out rupture of membranes  .  Patient is a . Prenatal record reviewed.      OB History    Para Term  AB Living   1 0 0 0 0 0   SAB IAB Ectopic Multiple Live Births   0 0 0 0 0      # Outcome Date GA Lbr Gavino/2nd Weight Sex Delivery Anes PTL Lv   1 Current            . Medical history: History reviewed. No pertinent past medical history.. Gestational Age 39w5d. VSS. Fetal movement present. Patient denies cramping, backache, vaginal discharge, pelvic pressure, UTI symptoms, GI problems, bloody show, vaginal bleeding, edema, headache, visual disturbances, epigastric or URQ pain, abdominal pain. Support persons  Saúl and pt's mother present.  Action: Verbal consent for EFM. Triage assessment completed.Fetal assessment: Presumed adequate fetal oxygenation documented (see flow record). Pt having occasional gonzales jalloh contractions, not feeling anything.  FHTs 120, moderate variabitliy, accelerations present, no decelerations.  Pt states large gush of fluid at 0200 then small amounts of clear fluid since then, needing to wear a pad.  Response: Dr. Aaliyah Osman informed of assessments, including lab ROM plus resulting positive. Plan per provider is oral misoprostol and admission to labor and delivery. Patient verbalized agreement with plan. Patient transferred to room 219 ambulatory, oriented to room and call light. Report given to Leslie Pierce RN.

## 2023-02-18 LAB
BASOPHILS # BLD AUTO: 0 10E3/UL (ref 0–0.2)
BASOPHILS NFR BLD AUTO: 0 %
EOSINOPHIL # BLD AUTO: 0.1 10E3/UL (ref 0–0.7)
EOSINOPHIL NFR BLD AUTO: 1 %
ERYTHROCYTE [DISTWIDTH] IN BLOOD BY AUTOMATED COUNT: 13.5 % (ref 10–15)
HCT VFR BLD AUTO: 36.3 % (ref 35–47)
HGB BLD-MCNC: 12.5 G/DL (ref 11.7–15.7)
IMM GRANULOCYTES # BLD: 0 10E3/UL
IMM GRANULOCYTES NFR BLD: 0 %
LYMPHOCYTES # BLD AUTO: 2.1 10E3/UL (ref 0.8–5.3)
LYMPHOCYTES NFR BLD AUTO: 21 %
MCH RBC QN AUTO: 32.1 PG (ref 26.5–33)
MCHC RBC AUTO-ENTMCNC: 34.4 G/DL (ref 31.5–36.5)
MCV RBC AUTO: 93 FL (ref 78–100)
MONOCYTES # BLD AUTO: 0.6 10E3/UL (ref 0–1.3)
MONOCYTES NFR BLD AUTO: 6 %
NEUTROPHILS # BLD AUTO: 7.2 10E3/UL (ref 1.6–8.3)
NEUTROPHILS NFR BLD AUTO: 72 %
NRBC # BLD AUTO: 0 10E3/UL
NRBC BLD AUTO-RTO: 0 /100
PLATELET # BLD AUTO: 151 10E3/UL (ref 150–450)
RBC # BLD AUTO: 3.9 10E6/UL (ref 3.8–5.2)
T PALLIDUM AB SER QL: NONREACTIVE
WBC # BLD AUTO: 10 10E3/UL (ref 4–11)

## 2023-02-18 PROCEDURE — 250N000009 HC RX 250: Performed by: OBSTETRICS & GYNECOLOGY

## 2023-02-18 PROCEDURE — 85025 COMPLETE CBC W/AUTO DIFF WBC: CPT | Performed by: OBSTETRICS & GYNECOLOGY

## 2023-02-18 PROCEDURE — 250N000011 HC RX IP 250 OP 636: Performed by: OBSTETRICS & GYNECOLOGY

## 2023-02-18 PROCEDURE — 36415 COLL VENOUS BLD VENIPUNCTURE: CPT | Performed by: OBSTETRICS & GYNECOLOGY

## 2023-02-18 PROCEDURE — 120N000001 HC R&B MED SURG/OB

## 2023-02-18 PROCEDURE — 258N000003 HC RX IP 258 OP 636: Performed by: OBSTETRICS & GYNECOLOGY

## 2023-02-18 PROCEDURE — 250N000013 HC RX MED GY IP 250 OP 250 PS 637: Performed by: OBSTETRICS & GYNECOLOGY

## 2023-02-18 RX ORDER — OXYTOCIN/0.9 % SODIUM CHLORIDE 30/500 ML
1-24 PLASTIC BAG, INJECTION (ML) INTRAVENOUS CONTINUOUS
Status: DISCONTINUED | OUTPATIENT
Start: 2023-02-18 | End: 2023-02-20 | Stop reason: HOSPADM

## 2023-02-18 RX ORDER — SODIUM CHLORIDE, SODIUM LACTATE, POTASSIUM CHLORIDE, CALCIUM CHLORIDE 600; 310; 30; 20 MG/100ML; MG/100ML; MG/100ML; MG/100ML
INJECTION, SOLUTION INTRAVENOUS CONTINUOUS PRN
Status: DISCONTINUED | OUTPATIENT
Start: 2023-02-18 | End: 2023-02-20 | Stop reason: HOSPADM

## 2023-02-18 RX ORDER — LIDOCAINE 40 MG/G
CREAM TOPICAL
Status: DISCONTINUED | OUTPATIENT
Start: 2023-02-18 | End: 2023-02-20 | Stop reason: HOSPADM

## 2023-02-18 RX ADMIN — PENICILLIN G 3 MILLION UNITS: 3000000 INJECTION, SOLUTION INTRAVENOUS at 09:30

## 2023-02-18 RX ADMIN — Medication 2 MILLI-UNITS/MIN: at 18:05

## 2023-02-18 RX ADMIN — MISOPROSTOL 25 MCG: 100 TABLET ORAL at 15:26

## 2023-02-18 RX ADMIN — PENICILLIN G 3 MILLION UNITS: 3000000 INJECTION, SOLUTION INTRAVENOUS at 13:23

## 2023-02-18 RX ADMIN — MISOPROSTOL 25 MCG: 100 TABLET ORAL at 13:23

## 2023-02-18 RX ADMIN — MISOPROSTOL 25 MCG: 100 TABLET ORAL at 05:23

## 2023-02-18 RX ADMIN — MISOPROSTOL 25 MCG: 100 TABLET ORAL at 07:11

## 2023-02-18 RX ADMIN — PENICILLIN G 3 MILLION UNITS: 3000000 INJECTION, SOLUTION INTRAVENOUS at 05:21

## 2023-02-18 RX ADMIN — PENICILLIN G 3 MILLION UNITS: 3000000 INJECTION, SOLUTION INTRAVENOUS at 21:21

## 2023-02-18 RX ADMIN — PENICILLIN G 3 MILLION UNITS: 3000000 INJECTION, SOLUTION INTRAVENOUS at 01:32

## 2023-02-18 RX ADMIN — MISOPROSTOL 25 MCG: 100 TABLET ORAL at 01:31

## 2023-02-18 RX ADMIN — MISOPROSTOL 25 MCG: 100 TABLET ORAL at 03:12

## 2023-02-18 RX ADMIN — PENICILLIN G 3 MILLION UNITS: 3000000 INJECTION, SOLUTION INTRAVENOUS at 17:23

## 2023-02-18 RX ADMIN — MISOPROSTOL 25 MCG: 100 TABLET ORAL at 09:30

## 2023-02-18 RX ADMIN — SODIUM CHLORIDE, POTASSIUM CHLORIDE, SODIUM LACTATE AND CALCIUM CHLORIDE: 600; 310; 30; 20 INJECTION, SOLUTION INTRAVENOUS at 21:21

## 2023-02-18 RX ADMIN — MISOPROSTOL 25 MCG: 100 TABLET ORAL at 11:18

## 2023-02-18 ASSESSMENT — ACTIVITIES OF DAILY LIVING (ADL)
ADLS_ACUITY_SCORE: 18

## 2023-02-18 NOTE — PROGRESS NOTES
"OB Progress Note  2023  9:59 AM    S:  Not feeling much for contractions, leaking some fluid.       O:  /68   Pulse 85   Temp 97.2  F (36.2  C) (Temporal)   Resp 16   Ht 1.626 m (5' 4\")   Wt 69.4 kg (153 lb)   SpO2 98%   BMI 26.26 kg/m    EFM: baseline 125, accelerations present, absent decelerations, moderate variability; Category I  [place]:  Ctx q1-6 min  SVE:  Deferred, closed on admit  Membranes: ruptured (23 at 0200)      A/P:  32 year old  @39w6d admitted with premature ROM, no laboir  1.  Routine cares  2.  Labor: Receiving PO cytotec for induction, plan total of 12 doses, then check cervix and likely start pitocin  3. GBS positive: Continue IV penicillin  4.  Anticipate MINDA Harding MD      "

## 2023-02-18 NOTE — PLAN OF CARE
Primip admitted from MAC with SROM at 0200, fluid clear. PO cytotec started to augment labor , IVAB started for + GBS. Continue with plan of care.

## 2023-02-19 ENCOUNTER — ANESTHESIA EVENT (OUTPATIENT)
Dept: OBGYN | Facility: CLINIC | Age: 33
End: 2023-02-19
Payer: COMMERCIAL

## 2023-02-19 ENCOUNTER — ANESTHESIA (OUTPATIENT)
Dept: OBGYN | Facility: CLINIC | Age: 33
End: 2023-02-19
Payer: COMMERCIAL

## 2023-02-19 PROCEDURE — 258N000003 HC RX IP 258 OP 636: Performed by: OBSTETRICS & GYNECOLOGY

## 2023-02-19 PROCEDURE — 120N000001 HC R&B MED SURG/OB

## 2023-02-19 PROCEDURE — 250N000009 HC RX 250: Performed by: ANESTHESIOLOGY

## 2023-02-19 PROCEDURE — 258N000003 HC RX IP 258 OP 636: Performed by: ANESTHESIOLOGY

## 2023-02-19 PROCEDURE — 3E0R3BZ INTRODUCTION OF ANESTHETIC AGENT INTO SPINAL CANAL, PERCUTANEOUS APPROACH: ICD-10-PCS | Performed by: ANESTHESIOLOGY

## 2023-02-19 PROCEDURE — 250N000011 HC RX IP 250 OP 636: Performed by: OBSTETRICS & GYNECOLOGY

## 2023-02-19 PROCEDURE — 370N000003 HC ANESTHESIA WARD SERVICE

## 2023-02-19 PROCEDURE — 250N000011 HC RX IP 250 OP 636: Performed by: ANESTHESIOLOGY

## 2023-02-19 PROCEDURE — 00HU33Z INSERTION OF INFUSION DEVICE INTO SPINAL CANAL, PERCUTANEOUS APPROACH: ICD-10-PCS | Performed by: ANESTHESIOLOGY

## 2023-02-19 RX ORDER — EPHEDRINE SULFATE 50 MG/ML
5 INJECTION, SOLUTION INTRAMUSCULAR; INTRAVENOUS; SUBCUTANEOUS
Status: DISCONTINUED | OUTPATIENT
Start: 2023-02-19 | End: 2023-02-20 | Stop reason: HOSPADM

## 2023-02-19 RX ORDER — ROPIVACAINE HYDROCHLORIDE 2 MG/ML
INJECTION, SOLUTION EPIDURAL; INFILTRATION; PERINEURAL
Status: COMPLETED | OUTPATIENT
Start: 2023-02-19 | End: 2023-02-19

## 2023-02-19 RX ORDER — ROPIVACAINE HYDROCHLORIDE 2 MG/ML
10 INJECTION, SOLUTION EPIDURAL; INFILTRATION; PERINEURAL ONCE
Status: DISCONTINUED | OUTPATIENT
Start: 2023-02-19 | End: 2023-02-20 | Stop reason: HOSPADM

## 2023-02-19 RX ORDER — NALBUPHINE HYDROCHLORIDE 10 MG/ML
2.5-5 INJECTION, SOLUTION INTRAMUSCULAR; INTRAVENOUS; SUBCUTANEOUS EVERY 6 HOURS PRN
Status: DISCONTINUED | OUTPATIENT
Start: 2023-02-19 | End: 2023-02-20 | Stop reason: HOSPADM

## 2023-02-19 RX ORDER — FENTANYL CITRATE 50 UG/ML
100 INJECTION, SOLUTION INTRAMUSCULAR; INTRAVENOUS ONCE
Status: DISCONTINUED | OUTPATIENT
Start: 2023-02-19 | End: 2023-02-20 | Stop reason: HOSPADM

## 2023-02-19 RX ADMIN — EPHEDRINE SULFATE 5 MG: 5 INJECTION INTRAVENOUS at 13:21

## 2023-02-19 RX ADMIN — Medication 12 ML/HR: at 02:36

## 2023-02-19 RX ADMIN — PENICILLIN G 3 MILLION UNITS: 3000000 INJECTION, SOLUTION INTRAVENOUS at 22:12

## 2023-02-19 RX ADMIN — PENICILLIN G 3 MILLION UNITS: 3000000 INJECTION, SOLUTION INTRAVENOUS at 17:56

## 2023-02-19 RX ADMIN — SODIUM CHLORIDE, POTASSIUM CHLORIDE, SODIUM LACTATE AND CALCIUM CHLORIDE 250 ML: 600; 310; 30; 20 INJECTION, SOLUTION INTRAVENOUS at 13:31

## 2023-02-19 RX ADMIN — SODIUM CHLORIDE, POTASSIUM CHLORIDE, SODIUM LACTATE AND CALCIUM CHLORIDE 1000 ML: 600; 310; 30; 20 INJECTION, SOLUTION INTRAVENOUS at 02:33

## 2023-02-19 RX ADMIN — PENICILLIN G 3 MILLION UNITS: 3000000 INJECTION, SOLUTION INTRAVENOUS at 05:21

## 2023-02-19 RX ADMIN — PENICILLIN G 3 MILLION UNITS: 3000000 INJECTION, SOLUTION INTRAVENOUS at 09:15

## 2023-02-19 RX ADMIN — Medication 12 ML/HR: at 17:50

## 2023-02-19 RX ADMIN — Medication 12 ML/HR: at 09:16

## 2023-02-19 RX ADMIN — PENICILLIN G 3 MILLION UNITS: 3000000 INJECTION, SOLUTION INTRAVENOUS at 01:39

## 2023-02-19 RX ADMIN — SODIUM CHLORIDE, POTASSIUM CHLORIDE, SODIUM LACTATE AND CALCIUM CHLORIDE: 600; 310; 30; 20 INJECTION, SOLUTION INTRAVENOUS at 09:14

## 2023-02-19 RX ADMIN — ROPIVACAINE HYDROCHLORIDE 10 ML: 2 INJECTION, SOLUTION EPIDURAL; INFILTRATION at 02:49

## 2023-02-19 RX ADMIN — PENICILLIN G 3 MILLION UNITS: 3000000 INJECTION, SOLUTION INTRAVENOUS at 13:18

## 2023-02-19 RX ADMIN — SODIUM CHLORIDE, POTASSIUM CHLORIDE, SODIUM LACTATE AND CALCIUM CHLORIDE: 600; 310; 30; 20 INJECTION, SOLUTION INTRAVENOUS at 13:36

## 2023-02-19 ASSESSMENT — ACTIVITIES OF DAILY LIVING (ADL)
ADLS_ACUITY_SCORE: 18

## 2023-02-19 NOTE — PROVIDER NOTIFICATION
02/19/23 0045   Provider Notification   Provider Name/Title Dr. Espino   Method of Notification Phone   Notification Reason   (Updated SVE 1-2/70/-1 soft, mid. FHT cat I, contractions irregular but palpating moderate. Max of Pit has been reached. Vitals all WNL. Orders to stop pit for an hour and restart per order set.)

## 2023-02-19 NOTE — PROGRESS NOTES
"OB Progress Note  2023  10:03 AM    S:  Comfortable with epidural, not feeling ctx. No concerns.       O:  /59   Pulse 85   Temp 97.9  F (36.6  C) (Temporal)   Resp 16   Ht 1.626 m (5' 4\")   Wt 69.4 kg (153 lb)   SpO2 99%   BMI 26.26 kg/m    EFM: baseline 120, accelerations absent, early decelerations, moderate variability; Category I  [place]:  Ctx q2-3 min  SVE:  5/80%/-1 at 8:45am  Membranes: ruptured (23)    Pitocin at 16 mU/min    A/P:  32 year old  @40w0d admitted with premature ROM, no labo  1.  Routine cares  2.  Labor: S/p PO cytotec x 24 hours for ripening, then Pitocin (max overnight, then turned off, now at 16), now making cervical change an likely active labor. Anticipate   3.  GBS positive: continue IV PCN, s/p adequate treatment    Christen Harding MD    "

## 2023-02-19 NOTE — ANESTHESIA PROCEDURE NOTES
"Epidural catheter Procedure Note    Pre-Procedure   Staff -        Anesthesiologist:  Katelyn Spears       Performed By: anesthesiologist       Location: OB       Pre-Anesthestic Checklist: patient identified, IV checked, risks and benefits discussed, informed consent, monitors and equipment checked and pre-op evaluation  Timeout:       Correct Patient: Yes        Correct Procedure: Yes        Correct Site: Yes        Correct Position: Yes   Procedure Documentation  Procedure: epidural catheter       Patient Position: sitting       Patient Prep/Sterile Barriers: sterile gloves, mask, patient draped       Skin prep: Betadine       Local skin infiltrated with mL of 1% lidocaine.        Insertion Site: L3-4. (midline approach).       Technique: LORT saline        Needle Type: Sonalighty needle       Needle Gauge: 17.        Needle Length (Inches): 3.5           Catheter threaded easily.         3.5 cm epidural space.         Threaded 10 cm at skin.         # of attempts: 1 and  # of redirects:  0    Assessment/Narrative         Paresthesias: No.       Test dose of 3 mL lidocaine 1.5% w/ 1:200,000 epinephrine at.         Test dose negative, 3 minutes after injection, for signs of intravascular, subdural, or intrathecal injection.       Insertion/Infusion Method: LORT saline       Aspiration negative for Heme or CSF via Epidural Catheter.    Medication(s) Administered   0.2% Ropivacaine (Epidural) - EPIDURAL   10 mL - 2/19/2023 2:49:00 AM   Comments:  No complications, patient tolerated the procedure well.  Sterile dressing placed.     After negative test dose, ropivacaine given in divided doses.      FOR [facility] ([facility]) ONLY:   Pain Team Contact information: please page the Pain Team Via [employer] Search \"Pain\". During daytime hours, please page the attending first. At night please page the resident first.    "

## 2023-02-19 NOTE — ANESTHESIA PREPROCEDURE EVALUATION
Anesthesia Pre-Procedure Evaluation    Patient: Kasie Ennis   MRN: [id number] : 1990        Procedure :           History reviewed. No pertinent past medical history.   Past Surgical History:   Procedure Laterality Date     ENT SURGERY        No Known Allergies   Social History     Tobacco Use     Smoking status: Never     Smokeless tobacco: Never   Substance Use Topics     Alcohol use: Not Currently     Alcohol/week: 4.0 standard drinks     Types: 2 Glasses of wine, 2 Cans of beer per week      Wt Readings from Last 1 Encounters:   23 69.4 kg (153 lb)        Anesthesia Evaluation            ROS/MED HX  ENT/Pulmonary:    (-) asthma   Neurologic:  - neg neurologic ROS     Cardiovascular:    (-) PIH   METS/Exercise Tolerance:     Hematologic:     (+) no anticoagulation therapy, no coagulopathy,     Musculoskeletal:       GI/Hepatic:     (+) GERD,     Renal/Genitourinary:       Endo:       Psychiatric/Substance Use:       Infectious Disease:       Malignancy:       Other:            Physical Exam    Airway        Mallampati: II   TM distance: > 3 FB   Neck ROM: full     Respiratory Devices and Support         Dental  no notable dental history         Cardiovascular   cardiovascular exam normal          Pulmonary   pulmonary exam normal                OUTSIDE LABS:  CBC:   Lab Results   Component Value Date    WBC 10.0 2023    HGB 12.5 2023    HGB 12.8 2023    HCT 36.3 2023     2023     BMP: No results found for: NA, POTASSIUM, CHLORIDE, CO2, BUN, CR, GLC  COAGS: No results found for: PTT, INR, FIBR  POC: No results found for: BGM, HCG, HCGS  HEPATIC: No results found for: ALBUMIN, PROTTOTAL, ALT, AST, GGT, ALKPHOS, BILITOTAL, BILIDIRECT, DONALD  OTHER: No results found for: PH, LACT, A1C, DOLORES, PHOS, MAG, LIPASE, AMYLASE, TSH, T4, T3, CRP, SED    Anesthesia Plan    ASA Status:  2      Anesthesia Type: Epidural.              Consents    Anesthesia Plan(s) and associated  risks, benefits, and realistic alternatives discussed. Questions answered and patient/representative(s) expressed understanding.    - Discussed:     - Discussed with:  Patient         Postoperative Care            Comments:    Other Comments: Orders to manage the epidural infusion have been entered, and through coordination with the nurse, we will continute to manage and monitor the patient's labor epidural.  We will continuously be available to adjust as needed thruout the entire L&D process.             Katelyn Spears

## 2023-02-20 ENCOUNTER — HOSPITAL ENCOUNTER (INPATIENT)
Facility: CLINIC | Age: 33
LOS: 2 days | Discharge: HOME | DRG: 776 | End: 2023-02-22
Attending: STUDENT IN AN ORGANIZED HEALTH CARE EDUCATION/TRAINING PROGRAM | Admitting: STUDENT IN AN ORGANIZED HEALTH CARE EDUCATION/TRAINING PROGRAM
Payer: COMMERCIAL

## 2023-02-20 VITALS
OXYGEN SATURATION: 100 % | SYSTOLIC BLOOD PRESSURE: 104 MMHG | TEMPERATURE: 97.8 F | RESPIRATION RATE: 16 BRPM | HEIGHT: 64 IN | DIASTOLIC BLOOD PRESSURE: 67 MMHG | BODY MASS INDEX: 26.12 KG/M2 | HEART RATE: 55 BPM | WEIGHT: 153 LBS

## 2023-02-20 LAB — HGB BLD-MCNC: 12.2 G/DL (ref 11.7–15.7)

## 2023-02-20 PROCEDURE — 722N000001 HC LABOR CARE VAGINAL DELIVERY SINGLE

## 2023-02-20 PROCEDURE — 36415 COLL VENOUS BLD VENIPUNCTURE: CPT | Performed by: OBSTETRICS & GYNECOLOGY

## 2023-02-20 PROCEDURE — 250N000013 HC RX MED GY IP 250 OP 250 PS 637: Performed by: STUDENT IN AN ORGANIZED HEALTH CARE EDUCATION/TRAINING PROGRAM

## 2023-02-20 PROCEDURE — 250N000009 HC RX 250: Performed by: OBSTETRICS & GYNECOLOGY

## 2023-02-20 PROCEDURE — 250N000013 HC RX MED GY IP 250 OP 250 PS 637: Performed by: OBSTETRICS & GYNECOLOGY

## 2023-02-20 PROCEDURE — 85018 HEMOGLOBIN: CPT | Performed by: OBSTETRICS & GYNECOLOGY

## 2023-02-20 PROCEDURE — 250N000011 HC RX IP 250 OP 636: Performed by: ANESTHESIOLOGY

## 2023-02-20 PROCEDURE — 120N000002 HC R&B MED SURG/OB UMMC

## 2023-02-20 PROCEDURE — 0KQM0ZZ REPAIR PERINEUM MUSCLE, OPEN APPROACH: ICD-10-PCS | Performed by: OBSTETRICS & GYNECOLOGY

## 2023-02-20 RX ORDER — DOCUSATE SODIUM 100 MG/1
100 CAPSULE, LIQUID FILLED ORAL DAILY
Status: DISCONTINUED | OUTPATIENT
Start: 2023-02-20 | End: 2023-02-22 | Stop reason: HOSPADM

## 2023-02-20 RX ORDER — ACETAMINOPHEN 325 MG/1
650 TABLET ORAL EVERY 4 HOURS PRN
Status: DISCONTINUED | OUTPATIENT
Start: 2023-02-20 | End: 2023-02-22 | Stop reason: HOSPADM

## 2023-02-20 RX ORDER — MISOPROSTOL 200 UG/1
400 TABLET ORAL
Status: DISCONTINUED | OUTPATIENT
Start: 2023-02-20 | End: 2023-02-22 | Stop reason: HOSPADM

## 2023-02-20 RX ORDER — MISOPROSTOL 200 UG/1
800 TABLET ORAL
Status: DISCONTINUED | OUTPATIENT
Start: 2023-02-20 | End: 2023-02-20 | Stop reason: HOSPADM

## 2023-02-20 RX ORDER — OXYTOCIN 10 [USP'U]/ML
10 INJECTION, SOLUTION INTRAMUSCULAR; INTRAVENOUS
Status: DISCONTINUED | OUTPATIENT
Start: 2023-02-20 | End: 2023-02-22 | Stop reason: HOSPADM

## 2023-02-20 RX ORDER — CARBOPROST TROMETHAMINE 250 UG/ML
250 INJECTION, SOLUTION INTRAMUSCULAR
Status: DISCONTINUED | OUTPATIENT
Start: 2023-02-20 | End: 2023-02-22 | Stop reason: HOSPADM

## 2023-02-20 RX ORDER — MODIFIED LANOLIN
OINTMENT (GRAM) TOPICAL
Status: DISCONTINUED | OUTPATIENT
Start: 2023-02-20 | End: 2023-02-22 | Stop reason: HOSPADM

## 2023-02-20 RX ORDER — METHYLERGONOVINE MALEATE 0.2 MG/ML
200 INJECTION INTRAVENOUS
Status: DISCONTINUED | OUTPATIENT
Start: 2023-02-20 | End: 2023-02-22 | Stop reason: HOSPADM

## 2023-02-20 RX ORDER — MODIFIED LANOLIN
OINTMENT (GRAM) TOPICAL
Status: DISCONTINUED | OUTPATIENT
Start: 2023-02-20 | End: 2023-02-20 | Stop reason: HOSPADM

## 2023-02-20 RX ORDER — CARBOPROST TROMETHAMINE 250 UG/ML
250 INJECTION, SOLUTION INTRAMUSCULAR
Status: DISCONTINUED | OUTPATIENT
Start: 2023-02-20 | End: 2023-02-20 | Stop reason: HOSPADM

## 2023-02-20 RX ORDER — ACETAMINOPHEN 325 MG/1
650 TABLET ORAL EVERY 4 HOURS PRN
Status: DISCONTINUED | OUTPATIENT
Start: 2023-02-20 | End: 2023-02-20 | Stop reason: HOSPADM

## 2023-02-20 RX ORDER — CALCIUM CARBONATE 500 MG/1
500 TABLET, CHEWABLE ORAL 3 TIMES DAILY PRN
Status: DISCONTINUED | OUTPATIENT
Start: 2023-02-20 | End: 2023-02-20 | Stop reason: HOSPADM

## 2023-02-20 RX ORDER — DOCUSATE SODIUM 100 MG/1
100 CAPSULE, LIQUID FILLED ORAL DAILY
Status: DISCONTINUED | OUTPATIENT
Start: 2023-02-20 | End: 2023-02-20 | Stop reason: HOSPADM

## 2023-02-20 RX ORDER — TRANEXAMIC ACID 10 MG/ML
1 INJECTION, SOLUTION INTRAVENOUS EVERY 30 MIN PRN
Status: DISCONTINUED | OUTPATIENT
Start: 2023-02-20 | End: 2023-02-22 | Stop reason: HOSPADM

## 2023-02-20 RX ORDER — OXYTOCIN/0.9 % SODIUM CHLORIDE 30/500 ML
340 PLASTIC BAG, INJECTION (ML) INTRAVENOUS CONTINUOUS PRN
Status: DISCONTINUED | OUTPATIENT
Start: 2023-02-20 | End: 2023-02-22 | Stop reason: HOSPADM

## 2023-02-20 RX ORDER — BISACODYL 10 MG
10 SUPPOSITORY, RECTAL RECTAL DAILY PRN
Status: DISCONTINUED | OUTPATIENT
Start: 2023-02-20 | End: 2023-02-22 | Stop reason: HOSPADM

## 2023-02-20 RX ORDER — OXYTOCIN/0.9 % SODIUM CHLORIDE 30/500 ML
340 PLASTIC BAG, INJECTION (ML) INTRAVENOUS CONTINUOUS PRN
Status: DISCONTINUED | OUTPATIENT
Start: 2023-02-20 | End: 2023-02-20 | Stop reason: HOSPADM

## 2023-02-20 RX ORDER — HYDROCORTISONE 25 MG/G
CREAM TOPICAL 3 TIMES DAILY PRN
Status: DISCONTINUED | OUTPATIENT
Start: 2023-02-20 | End: 2023-02-20 | Stop reason: HOSPADM

## 2023-02-20 RX ORDER — OXYTOCIN 10 [USP'U]/ML
10 INJECTION, SOLUTION INTRAMUSCULAR; INTRAVENOUS
Status: DISCONTINUED | OUTPATIENT
Start: 2023-02-20 | End: 2023-02-20 | Stop reason: HOSPADM

## 2023-02-20 RX ORDER — MISOPROSTOL 200 UG/1
800 TABLET ORAL
Status: DISCONTINUED | OUTPATIENT
Start: 2023-02-20 | End: 2023-02-22 | Stop reason: HOSPADM

## 2023-02-20 RX ORDER — IBUPROFEN 800 MG/1
800 TABLET, FILM COATED ORAL EVERY 6 HOURS PRN
Status: DISCONTINUED | OUTPATIENT
Start: 2023-02-20 | End: 2023-02-22 | Stop reason: HOSPADM

## 2023-02-20 RX ORDER — METHYLERGONOVINE MALEATE 0.2 MG/ML
200 INJECTION INTRAVENOUS
Status: DISCONTINUED | OUTPATIENT
Start: 2023-02-20 | End: 2023-02-20 | Stop reason: HOSPADM

## 2023-02-20 RX ORDER — MISOPROSTOL 200 UG/1
400 TABLET ORAL
Status: DISCONTINUED | OUTPATIENT
Start: 2023-02-20 | End: 2023-02-20 | Stop reason: HOSPADM

## 2023-02-20 RX ORDER — BISACODYL 10 MG
10 SUPPOSITORY, RECTAL RECTAL DAILY PRN
Status: DISCONTINUED | OUTPATIENT
Start: 2023-02-20 | End: 2023-02-20 | Stop reason: HOSPADM

## 2023-02-20 RX ORDER — TRANEXAMIC ACID 10 MG/ML
1 INJECTION, SOLUTION INTRAVENOUS EVERY 30 MIN PRN
Status: DISCONTINUED | OUTPATIENT
Start: 2023-02-20 | End: 2023-02-20 | Stop reason: HOSPADM

## 2023-02-20 RX ORDER — HYDROCORTISONE 25 MG/G
CREAM TOPICAL 3 TIMES DAILY PRN
Status: DISCONTINUED | OUTPATIENT
Start: 2023-02-20 | End: 2023-02-22 | Stop reason: HOSPADM

## 2023-02-20 RX ORDER — FAMOTIDINE 10 MG
10 TABLET ORAL 2 TIMES DAILY
Status: DISCONTINUED | OUTPATIENT
Start: 2023-02-20 | End: 2023-02-20 | Stop reason: HOSPADM

## 2023-02-20 RX ORDER — IBUPROFEN 400 MG/1
800 TABLET, FILM COATED ORAL EVERY 6 HOURS PRN
Status: DISCONTINUED | OUTPATIENT
Start: 2023-02-20 | End: 2023-02-20 | Stop reason: HOSPADM

## 2023-02-20 RX ADMIN — ACETAMINOPHEN 650 MG: 325 TABLET, FILM COATED ORAL at 02:17

## 2023-02-20 RX ADMIN — Medication 12 ML/HR: at 00:13

## 2023-02-20 RX ADMIN — CALCIUM CARBONATE (ANTACID) CHEW TAB 500 MG 500 MG: 500 CHEW TAB at 02:16

## 2023-02-20 RX ADMIN — FAMOTIDINE 10 MG: 10 TABLET ORAL at 10:03

## 2023-02-20 RX ADMIN — DOCUSATE SODIUM 100 MG: 100 CAPSULE, LIQUID FILLED ORAL at 10:03

## 2023-02-20 RX ADMIN — IBUPROFEN 800 MG: 800 TABLET, FILM COATED ORAL at 23:04

## 2023-02-20 RX ADMIN — Medication 340 ML/HR: at 00:43

## 2023-02-20 RX ADMIN — IBUPROFEN 800 MG: 400 TABLET ORAL at 10:03

## 2023-02-20 RX ADMIN — ACETAMINOPHEN 650 MG: 325 TABLET ORAL at 23:04

## 2023-02-20 RX ADMIN — BENZOCAINE: 11.4 AEROSOL, SPRAY TOPICAL at 04:57

## 2023-02-20 RX ADMIN — ACETAMINOPHEN 650 MG: 325 TABLET, FILM COATED ORAL at 10:03

## 2023-02-20 RX ADMIN — IBUPROFEN 800 MG: 400 TABLET ORAL at 02:16

## 2023-02-20 ASSESSMENT — ACTIVITIES OF DAILY LIVING (ADL)
ADLS_ACUITY_SCORE: 18
ADLS_ACUITY_SCORE: 22
ADLS_ACUITY_SCORE: 35
ADLS_ACUITY_SCORE: 18
ADLS_ACUITY_SCORE: 35
ADLS_ACUITY_SCORE: 35
ADLS_ACUITY_SCORE: 22
ADLS_ACUITY_SCORE: 18
ADLS_ACUITY_SCORE: 35
ADLS_ACUITY_SCORE: 35

## 2023-02-20 NOTE — PROGRESS NOTES
Discussed patient with Dr. Nunes who accepts transfer to [facility].   Charge RN aware and has accepted patient.   Charge RN at [facility] aware of above and will coordinate transportation.     Maria E Rosario MD  2/20/2023  11:12 AM

## 2023-02-20 NOTE — PLAN OF CARE
VSS.  Fundus firm, midline U/1. Voided x2 since delivery. Pain well controlled with tylenol/ibuprofen. Using ice, tucks and benzocaine spray to perineum. Ambulated to bathroom SBA, assisted with perineal care. Pumping initiated.  Continue to monitor and notify MD as needed.

## 2023-02-20 NOTE — PLAN OF CARE
Patient arrived to [facility] unit via  EMS stretcher  at 1400 ,with belongings, with infant in  NICU . Received report from DHARMESH Jacobson at [facility] around 1130.   Unit and room orientation started. Call light given and within arms reach; no concerns present at this time. Continue with plan of care.

## 2023-02-20 NOTE — H&P
History and Physical     Kasie Ennis MRN# [id number]   YOB: 1990 Age: 32 year old      Date of Admission: 2023    Primary care provider: No Ref-Primary, Physician      Assessment and Plan:       32 year old  who is PPD#0 from a  at [facility]. Her cares are transferred here to the [facility] for escalation of infant cares. Pregnancy notable for GBS+, cervical polyp and BV in pregnancy. Labor was protracted and patient had one fever but otherwise no evidence of Triple I. Infant required delivery of the posterior arm for full delivery. Apgars were 1, 6 and 8 at 1, 5 and 10 minutes, respectively. Second degree laceration was sustained and repaired. Postpartum course overall unremarkable thus far. Pain well-controlled, bleeding manageable. Working on pumping.    #Infant in NICU   -Social work following. Reports mood is okay.   -Per patient, infant having procedure this evening. Planning on visiting afterwards.    #Postpartum cares   - Continue routine post-partum cares.     - Heme: Hgb 12.5 > EBL 50> Hgb 12.2  - Baby: In NICU, working on pumping  - Contraception: to discuss   - Rh, positive. Rhogam not indicated   - Rubella immune  - Ppx: SCDs, encourage ambulation     Tati Chung, MS3    Patient discussed with Zina Nunes MD.     I was present with the medical student who participated in the service and in the documentation of the note. I have verified the history and personally performed the physical exam and medical decision making. I agree with the assessment and plan of care as documented in the note.    Zina Nunes MD              HPI:     Kasie Ennis is a 32 year old  at 40w1d who presents today for postpartum cares after being transferred to the  from [facility] for escalated infant cares, POD#0 from .    Overall, she is doing well. She is still having some significant cramping, but this is well managed with tylenol and ibuprofen. Bleeding is manageable. She  has been urinating independently. Has not had a BM but taking stool softener. No headaches, vision changes, chest pain, breathing difficulties, or RUQ pain.      Baby is in NICU. Patient attempted to visit infant this evening but reports that he was having a procedure. Patient having some pain sitting in wheelchair when attempting to visit NICU, and ambulating is still challenging due to soreness. States that mood is okay in terms of delivery, appropriately concerned about infant in NICU. Here with partner.       OB History:    OB History    Para Term  AB Living   1 1 1 0 0 1   SAB IAB Ectopic Multiple Live Births   0 0 0 0 1      # Outcome Date GA Lbr Gavino/2nd Weight Sex Delivery Anes PTL Lv   1 Term 23 40w1d 19:15 / 03:28 3.4 kg (7 lb 7.9 oz) M Vag-Spont EPI N RAVINDER      Complications: Dysfunctional Labor      Name: YULIYA LANDRY      Apgar1: 1  Apgar5: 6        Prenatal Lab Results:  Lab Results   Component Value Date    ABO AB 2020    RH Pos 2020    AS Negative 2023    HEPBANG Nonreactive 2020    HGB 12.2 2023       GBS Status:   No results found for: GBS             Past Medical History:   No past medical history on file.          Past Surgical History:     Past Surgical History:   Procedure Laterality Date     ENT SURGERY               Social History:     Social History     Tobacco Use     Smoking status: Never     Smokeless tobacco: Never   Substance Use Topics     Alcohol use: Not Currently     Alcohol/week: 4.0 standard drinks     Types: 2 Glasses of wine, 2 Cans of beer per week             Family History:   No family history on file.          Immunizations:     Immunization History   Administered Date(s) Administered     HepA-Adult 2020     Hepatitis B Immunity: Titer 2020     Influenza Vaccine >6 months (Alfuria,Fluzone) 2020     MMR 2020     TDAP Vaccine (Adacel) 2020     Typhoid IM 2020     Varicella  01/09/2020            Allergies:   No Known Allergies          Medications:     Medications Prior to Admission   Medication Sig Dispense Refill Last Dose     Prenatal Vit-Fe Fumarate-FA (PNV PRENATAL PLUS MULTIVITAMIN) 27-1 MG TABS per tablet Take 1 tablet by mouth daily                Review of Systems & Physical Exam:     The Review of Systems is negative other than noted in the HPI      /70 (BP Location: Left arm, Patient Position: Semi-Kelley's, Cuff Size: Adult Regular)   Pulse 79   Temp 97.9  F (36.6  C) (Oral)   Resp 14   Gen: Sitting up in bed, pumping, NAD  CV: RRR, no murmurs  Lungs: CTAB, non-labored breathing  Abd: Abdomen non-tender, non-distended. 4 patches of erythema with epidermal abrasion immediately superior/inferior and lateral to umbilicus  Ext: Trace peripheral extremity edema           Data:     Lab Results   Component Value Date    WBC 10.0 02/18/2023     Lab Results   Component Value Date    RBC 3.90 02/18/2023     Lab Results   Component Value Date    HGB 12.2 02/20/2023     Lab Results   Component Value Date    HCT 36.3 02/18/2023     No components found for: MCT  Lab Results   Component Value Date    MCV 93 02/18/2023     Lab Results   Component Value Date    MCH 32.1 02/18/2023     Lab Results   Component Value Date    MCHC 34.4 02/18/2023     Lab Results   Component Value Date    RDW 13.5 02/18/2023     Lab Results   Component Value Date     02/18/2023

## 2023-02-20 NOTE — PLAN OF CARE
Data: Vital signs within normal limits. Postpartum checks within normal limits - see flow record. Patient eating and drinking normally. Patient able to empty bladder independently and is up ambulating. No apparent signs of infection.  Patient performing self cares and is breast pumping for baby in NICU.   Action: Patient denies pain, reports that she has had occasional cramping but currently has hot pack on lower abdomen.   Response: Baby is in NICU for cooling, discussed with patient that she can visit baby anytime and gave patient number to call NICU if she would like an update. : Saúl is present.   Plan: Continue with the plan of care. Report given to Alicia Davies RN who assumes care of patient.

## 2023-02-20 NOTE — PLAN OF CARE
Took over patient cares at 0720. Plan of care gone over with pt and . At 0840 SVE done and Pt 5cm. At 1045 SVE done and pt now 7cm. At 1305 SVE done and pt now 7-8cm, Minimal change. Pt being repositioned. At 1545 Pt 8cm. At 1730 SVE done and no change. Dr. Harding in department and asked to have me lace an IUPC. IUPC placed at 1740. At 1910 Dr. Harding in room and upadted on maternal status and fht's and SVE done. Pt now 9cm. At 1915 Report given to Carine VERDE RN to take over pt cares.

## 2023-02-20 NOTE — PROGRESS NOTES
"OB Progress Note  2023  7:16 PM    S:  In to assess patient and discuss plan. I have been reviewing tracing over past 2 hours while in hospital. Kasie is feeling more back pain and rectal pressure, generally tired. Has had broth and apple juice today.     O:  /84   Pulse 85   Temp (!) 100.8  F (38.2  C)   Resp 18   Ht 1.626 m (5' 4\")   Wt 69.4 kg (153 lb)   SpO2 98%   BMI 26.26 kg/m    EFM: baseline 130, accelerations present, absent decelerations, minimal to moderate variability; Category I  [place]:  Ctx q1.5-4 min, IUPC in place, inadequate MVU's  SVE:  9/90%/+1  Membranes: ruptured (23)    Pitocin at 22 mU/min    A/P:  32 year old  @40w0d admitted with premature ROM, no labor  1.  Routine cares. Temp mildly elevated at 100.8, plan to recheck in 30min  2.  Labor: S/p PO cytotec x 24 hours for ripening, then Pitocin (max overnight, then turned off, now at 22), making slow cervical change, but did make change since last exam 1 hour ago. Will recheck in 2 hours  3.  GBS positive: continue IV PCN, s/p adequate treatment    Christen Harding MD    "

## 2023-02-20 NOTE — L&D DELIVERY NOTE
OB Delivery Summary      HISTORY OF PRESENT ILLNESS:   with  IUP @ 39w5d who presented with premature ruptured of membranes without labor. Ruptured on 23 at 2am.  Her prenatal course was  complicated by a cervical polyp at NOB at outside clinic, transfer of care at 12 weeks and GBS positive status.  Prenatal labs were significant for blood type AB +, rubella status immune.  Glucose challenge test normal.  Group beta strep culture was positive.  Estimated fetal weight on admission was approximately 7.5lb.        FIRST STAGE OF LABOR:  The patient was admitted to Labor and Delivery with a fetal heart rate tracing that was category I. Rupture of membranes was confirmed and she was given oral cytotec x 24 hours for cervical ripening. Penicillin was also started for GBS prophylaxis. Pitocin was started after 12 doses of cytotec and she gradually progressed in labor. An IUPC was placed to assess contraction strength for protracted active phase of labor and MVU's were never adequate. She continued to make slow cervical change and became completely dilated around 9:15pm and was allowed to labor down for 1 hour.      SECOND STAGE OF LABOR:  The patient began pushing around 10:15pm from the +1 station.  During pushing the FHR tracing was reassuring, with a baseline of 135, moderate variability, no accelerations and intermittent decelerations  She had one temperature of 100.8F during this time but repeat temps were normal and no chorioamnionitis was noted. She gradually brought the vertex down in the birth canal and [facility]  After delivery of the head, the anterior shoulder did not deliver with gentle downward traction and the posterior arm was then easily delivered. The remainder of the body delivered without difficulty.  A loose nuchal cord was noted and reduced. The infant was placed on the mother's chest.  Delayed cord clamping was performed.         THIRD STAGE OF LABOR:  The cord was clamped and cut. The placenta then delivered spontaneously and appeared intact with a 3-vessel cord at 12:53am.  Pitocin was started and fundal massage was performed.  Perineum was inspected and a small second degree laceration was noted. It was repaired with a running suture of 3-0 Vicryl. Quantitative blood loss for the delivery was 50.       Both mother and baby tolerated delivery well and there were no complications. Fetal weight was 7lb8oz and Apgars were 1, 6 and 8 at 1, 5 and 10 minutes, respectively. Cord gases showed arterial cord pH of 7.16, PO2 of 19, PCO2 of 66 with base excess of -7.      Christen Harding MD  2/20/2023  1:07 AM

## 2023-02-20 NOTE — PROVIDER NOTIFICATION
02/19/23 2122   Provider Notification   Provider Name/Title Dr. Harding   Method of Notification Phone   Notification Reason   (SVE 10/100/0 station, patient received epidural bolus and feels no presure yet. Afebrile, contractions are inadequate, FHT moderate variability but recurrent lates. 24 of Pit. Orders to labor down for an hour, and then start pushing.)

## 2023-02-20 NOTE — CONSULTS
[facility]  MATERNAL CHILD HEALTH   INITIAL NICU PSYCHOSOCIAL ASSESSMENT      DATA:      *This note was copied from babies chart.*     Presenting Information: Mom, Kasie, delivered an infant boy , Micheal on 2023 at 40w1d in the setting of  . Baby was admitted to the NICU for respiratory failure requiring CPAP.  SW was consulted to meet with this family per NICU admission of infant and providing social support.      Living Situation: Parents, Kasie and Saúl, are  and live together in [place]. Micheal is their first child.       Social Support: They stated that they have a lot of local support. Saúl's sister and brother-in-law live in the same community a few blocks away. Additionally, they stated that they have many friends that live in their community.      Education/Employment: Both parents are employed. Mom is a  at the [employer] and dad is a  at [employer]. Mom has 6 weeks of maternity leave. Dad does not qualify for paternity leave, since he has not been at [employer] for a year. However, he does have 3 weeks of time-off that he can take.     Insurance: [employer] insurance under mothers.      Source of Financial Support: parental employment     Mental Health History: Declined.     History of Postpartum Mood Disorders: N/A first child.      Chemical Health History: Declined.      Legal/Child Protection Involvement: Declined.      INTERVENTION:        Chart review    Collaboration with team: Primary SW    Conducted Psychosocial Assessment    Introduction to Maternal Child Health SW role and scope of practice    Orientation to the NICU (parking, lodging, meals, visitation)    Validated emotions and provided supportive listening    SW described process for birth certificate, social security card and insurance process.     Provided resources and referrals  ? parking pass    Provided psychoeducation on  mood disorders and  "indicated that SW would continue to monitor mood and support bridging to mental health resources as needed.    Provided SW contact info     ASSESSMENT:      Coping: Parents state that they are coping well. This NICU admission was unexpected and parents say that it is \"tough\" considering there was no complications during pregnancy. Dad states that the medical staff are appropriate and explaining procedures and how Ishir is doing appropriately. Parents state that they can lean on each other for emotional support.      Assessment of parental risk for PMAD: Higher than average risk, considering medically complexity and unexpected NICU admission. Per dad, mom had a very long labor.      Risk Factors: first time parents and unexpected hospitalization      Resiliency Factors & Strengths: local family, strong social support, parental employment, stable housing, reliable transportation, able and willing to ask for help/accept help, able and willing to ask advocate for self/baby, willingness to have vulnerable conversations about emotions and demonstrated ability to integrate new information      PLAN:      SW will continue to follow for supportive intervention.     Primary SW will continue to follow:   Erin Morphew Lauren Paget [facility], [facility]     Email: [email or web address]  Phone: [phone]  Pager: [phone]  *NO LETTER*    "

## 2023-02-20 NOTE — PROGRESS NOTES
Post-partum Note      S: Patient is doing okay today. Has not tried po diet. Has minimal bleeding. Pumping, not getting any return. Baby at [facility] for cooling.     O:  Patient Vitals for the past 24 hrs:   BP Temp Temp src Pulse Resp SpO2   02/20/23 0802 104/67 97.8  F (36.6  C) Oral 55 16 --   02/20/23 0415 106/67 98.8  F (37.1  C) Oral 57 18 --   02/20/23 0253 -- -- -- -- -- 100 %   02/20/23 0248 -- -- -- -- -- 100 %   02/20/23 0245 129/70 -- -- -- -- 100 %   02/20/23 0238 -- -- -- -- -- 100 %   02/20/23 0233 -- -- -- -- -- 100 %   02/20/23 0230 132/75 -- -- -- -- 100 %   02/20/23 0223 -- -- -- -- -- 100 %   02/20/23 0218 -- -- -- -- -- 100 %   02/20/23 0215 121/71 -- -- -- -- 99 %   02/20/23 0208 -- -- -- -- -- 99 %   02/20/23 0203 -- -- -- -- -- 99 %   02/20/23 0200 136/81 -- -- -- -- 100 %   02/20/23 0153 -- -- -- -- -- 99 %   02/20/23 0148 -- -- -- -- -- 100 %   02/20/23 0145 127/74 -- -- -- -- 100 %   02/20/23 0138 -- -- -- -- -- 100 %   02/20/23 0133 -- -- -- -- -- 99 %   02/20/23 0130 127/73 -- -- -- -- 99 %   02/20/23 0123 -- -- -- -- -- 100 %   02/20/23 0118 -- -- -- -- -- 99 %   02/20/23 0115 117/73 -- -- -- -- --   02/20/23 0113 -- -- -- -- -- 99 %   02/20/23 0108 -- -- -- -- -- 98 %   02/20/23 0104 -- 98.8  F (37.1  C) -- -- -- --   02/20/23 0103 -- -- -- -- -- 98 %   02/20/23 0100 118/72 -- -- -- -- 98 %   02/20/23 0040 -- -- -- -- -- 94 %   02/20/23 0039 -- -- -- -- -- 93 %   02/20/23 0035 -- -- -- -- -- 99 %   02/20/23 0030 -- -- -- -- -- 99 %   02/20/23 0025 -- -- -- -- -- 99 %   02/20/23 0020 -- -- -- -- -- 98 %   02/20/23 0016 -- -- -- -- -- 93 %   02/20/23 0015 -- -- -- -- -- 100 %   02/20/23 0010 -- -- -- -- -- 99 %   02/20/23 0005 -- -- -- -- -- 100 %   02/20/23 0000 -- -- -- -- -- 98 %   02/19/23 2355 -- -- -- -- -- 98 %   02/19/23 2350 -- -- -- -- -- 99 %   02/19/23 2345 -- -- -- -- -- 100 %   02/19/23 2340 -- -- -- -- -- 99 %   02/19/23 2335 -- -- -- -- -- 98 %   02/19/23 2330 -- --  -- -- -- (!) 80 %   02/19/23 2325 -- -- -- -- -- 98 %   02/19/23 2320 -- -- -- -- -- 99 %   02/19/23 2315 -- -- -- -- -- 97 %   02/19/23 2237 -- -- -- -- -- 97 %   02/19/23 2232 -- -- -- -- -- (!) 89 %   02/19/23 2227 -- -- -- -- -- 96 %   02/19/23 2222 -- -- -- -- -- 95 %   02/19/23 2220 -- -- -- -- -- 94 %   02/19/23 2217 -- 99  F (37.2  C) -- -- -- 98 %   02/19/23 2212 -- -- -- -- -- 98 %   02/19/23 2211 (!) 144/77 -- -- -- -- --   02/19/23 2207 -- -- -- -- -- 98 %   02/19/23 2204 -- -- -- -- -- 94 %   02/19/23 2202 -- -- -- -- -- 98 %   02/19/23 2157 -- -- -- -- -- 99 %   02/19/23 2156 132/78 -- -- -- -- --   02/19/23 2152 -- -- -- -- -- 100 %   02/19/23 2147 -- -- -- -- -- 99 %   02/19/23 2142 -- -- -- -- -- 99 %   02/19/23 2141 130/73 -- -- -- -- --   02/19/23 2137 -- -- -- -- -- 99 %   02/19/23 2132 -- -- -- -- -- 99 %   02/19/23 2127 -- -- -- -- -- 99 %   02/19/23 2122 -- -- -- -- -- 100 %   02/19/23 2117 -- -- -- -- -- 100 %   02/19/23 2113 -- 97.7  F (36.5  C) -- -- -- --   02/19/23 2112 -- -- -- -- -- 98 %   02/19/23 2111 112/61 -- -- -- -- --   02/19/23 2107 -- -- -- -- -- 98 %   02/19/23 2102 -- -- -- -- -- 99 %   02/19/23 2058 112/60 -- -- -- -- --   02/19/23 2057 -- -- -- -- -- 100 %   02/19/23 2052 -- -- -- -- -- 99 %   02/19/23 2049 -- 98.2  F (36.8  C) -- -- -- --   02/19/23 2047 -- -- -- -- -- 98 %   02/19/23 2046 -- -- -- -- -- (!) 84 %   02/19/23 2042 -- -- -- -- -- 99 %   02/19/23 2041 114/60 -- -- -- -- --   02/19/23 2037 -- -- -- -- -- 99 %   02/19/23 2032 -- -- -- -- -- 98 %   02/19/23 2027 -- -- -- -- -- 98 %   02/19/23 2026 95/56 -- -- -- -- --   02/19/23 2022 -- -- -- -- -- 97 %   02/19/23 2017 -- -- -- -- -- 98 %   02/19/23 2012 -- -- -- -- -- 98 %   02/19/23 2011 96/55 -- -- -- -- --   02/19/23 2007 -- -- -- -- -- 98 %   02/19/23 2003 -- 99.7  F (37.6  C) -- -- -- --   02/19/23 2001 -- -- -- -- -- 98 %   02/19/23 1956 125/74 -- -- -- -- 99 %   02/19/23 1951 -- -- -- -- -- 98 %    02/19/23 1946 -- -- -- -- -- 98 %   02/19/23 1941 124/78 -- -- -- -- 98 %   02/19/23 1936 -- -- -- -- -- 99 %   02/19/23 1931 -- -- -- -- -- 99 %   02/19/23 1926 130/79 -- -- -- -- 99 %   02/19/23 1921 -- -- -- -- -- 98 %   02/19/23 1915 -- -- -- -- -- 98 %   02/19/23 1910 117/75 -- -- -- -- 97 %   02/19/23 1906 -- -- -- -- -- 97 %   02/19/23 1901 -- -- -- -- -- 97 %   02/19/23 1855 -- (!) 100.8  F (38.2  C) Temporal -- 18 98 %   02/19/23 1851 -- -- -- -- -- 99 %   02/19/23 1846 -- -- -- -- -- 99 %   02/19/23 1841 137/82 -- -- -- -- 98 %   02/19/23 1836 -- -- -- -- -- 98 %   02/19/23 1830 134/84 -- -- -- 16 98 %   02/19/23 1826 -- -- -- -- -- 99 %   02/19/23 1821 -- -- -- -- -- 98 %   02/19/23 1816 -- -- -- -- -- 99 %   02/19/23 1811 133/82 -- -- -- -- 99 %   02/19/23 1806 -- -- -- -- -- 98 %   02/19/23 1800 (!) 135/91 -- -- -- 18 100 %   02/19/23 1730 115/71 98.4  F (36.9  C) Temporal -- 16 100 %   02/19/23 1700 128/73 -- -- -- 18 100 %   02/19/23 1645 -- 99.4  F (37.4  C) Temporal -- 16 100 %   02/19/23 1615 129/80 -- -- -- -- 100 %   02/19/23 1600 -- -- -- -- -- 100 %   02/19/23 1545 117/69 -- -- -- 16 100 %   02/19/23 1510 -- 98.7  F (37.1  C) Temporal -- 18 100 %   02/19/23 1500 114/75 -- -- -- -- 99 %   02/19/23 1445 115/72 -- -- -- 16 100 %   02/19/23 1415 112/59 -- -- -- -- 99 %   02/19/23 1400 113/58 -- -- -- -- 99 %   02/19/23 1345 107/64 -- -- -- 16 99 %   02/19/23 1310 92/56 99.1  F (37.3  C) Temporal -- 18 97 %   02/19/23 1305 92/56 -- -- -- -- 98 %   02/19/23 1245 113/72 -- -- -- 18 97 %   02/19/23 1215 109/73 -- -- -- 16 99 %   02/19/23 1200 115/74 -- -- -- -- --   02/19/23 1145 104/72 99  F (37.2  C) Temporal -- 18 98 %   02/19/23 1115 108/59 -- -- -- 16 99 %   02/19/23 1100 112/67 -- -- -- -- 100 %   02/19/23 1045 92/55 99.5  F (37.5  C) Temporal -- 16 99 %   02/19/23 1015 114/73 -- -- -- 16 100 %       Gen:  Resting comfortably, NAD  Pulm:  Breathing comfortably on room air  Abd:  Soft,  appropriately ttp, non-distended.Fundus below umbilicus, firm and non-tender.  Ext:  non-tender, tr bilateral LE edema    I/O last 3 completed shifts:  In: -   Out: 4350 [Urine:4300; Blood:50]    Hgb:     Hemoglobin   Date Value Ref Range Status   2023 12.2 11.7 - 15.7 g/dL Final   2023 12.5 11.7 - 15.7 g/dL Final       Assessment/Plan:  32 year old  on PPD #0 s/p  after presenting in [facility] at 39+5weeks.Delivery at 40+1 weeks. Second degree laceration   Pregnancy complicated by:  GBS+  Cervical polyp  GEORGE at 12 weeks   BV x 2 at end of pregnancy, both treated     1. Continue with routine postpartum management  2. Pain meds- oral   3. EBL: 50ml ; pre hemoglobin 12.5, post hemogobin 12.2, no symptoms of anemia.   4.   Lab Results   Component Value Date    ABO AB 2020    RH Pos 2020    , Rubella immune  5. Feed: pumping for baby in  NICU  6. CV/RESP: vss. Had one fever 100.8F prior to delivery on . Afebrile since  7. DVT PPX: ambulation    Dispo: Anticipate transfer to [place] pending bed placement due to baby in NICU at [place] for cooling.     MD Suresh Urbano OB/GYN  2023, 10:01 AM

## 2023-02-20 NOTE — PLAN OF CARE
Patient complains of moderate perineal and groin discomfort with movement as well as generalized soreness.  Rubber donut given for comfort.  Abdominal binder given per patient request.  Tylenol and ibuprofen given PO.  Using ice packs, tucks and benzocaine spray to perineum.  Patient slow moving but steady gait.  Voiding adequately, lochia scant.  Assisted with use of breast pump.  Encouraged patient to pump every 3 hours if able.  Report given to Elise BARROSO over at [facility] who will assume cares.  Patient transferred around 1315.

## 2023-02-21 LAB — HGB BLD-MCNC: 11.3 G/DL (ref 11.7–15.7)

## 2023-02-21 PROCEDURE — 120N000002 HC R&B MED SURG/OB UMMC

## 2023-02-21 PROCEDURE — 85018 HEMOGLOBIN: CPT | Performed by: STUDENT IN AN ORGANIZED HEALTH CARE EDUCATION/TRAINING PROGRAM

## 2023-02-21 PROCEDURE — 36415 COLL VENOUS BLD VENIPUNCTURE: CPT | Performed by: STUDENT IN AN ORGANIZED HEALTH CARE EDUCATION/TRAINING PROGRAM

## 2023-02-21 PROCEDURE — 250N000013 HC RX MED GY IP 250 OP 250 PS 637: Performed by: STUDENT IN AN ORGANIZED HEALTH CARE EDUCATION/TRAINING PROGRAM

## 2023-02-21 RX ADMIN — ACETAMINOPHEN 650 MG: 325 TABLET ORAL at 18:53

## 2023-02-21 RX ADMIN — ACETAMINOPHEN 650 MG: 325 TABLET ORAL at 12:53

## 2023-02-21 RX ADMIN — IBUPROFEN 800 MG: 800 TABLET, FILM COATED ORAL at 05:59

## 2023-02-21 RX ADMIN — ACETAMINOPHEN 650 MG: 325 TABLET ORAL at 05:58

## 2023-02-21 RX ADMIN — IBUPROFEN 800 MG: 800 TABLET, FILM COATED ORAL at 12:53

## 2023-02-21 RX ADMIN — DOCUSATE SODIUM 100 MG: 100 CAPSULE, LIQUID FILLED ORAL at 09:33

## 2023-02-21 RX ADMIN — IBUPROFEN 800 MG: 800 TABLET, FILM COATED ORAL at 18:53

## 2023-02-21 ASSESSMENT — ACTIVITIES OF DAILY LIVING (ADL)
ADLS_ACUITY_SCORE: 35
ADLS_ACUITY_SCORE: 18
ADLS_ACUITY_SCORE: 35

## 2023-02-21 NOTE — PROGRESS NOTES
Postpartum Progress Note  Kasie Ennis  [id number]    Subjective:   Patient is feeling okay this AM.  Lochia like menses.  Eating and drinking regular food without nausea/emesis.  Ambulating without difficulty, lightheadedness. Pain is adequately controlled on PO medications. Some cramping with pumping. Baby in NICU. Voiding without difficulty.    Objective:  BP 99/67 (BP Location: Left arm, Patient Position: Semi-Kelley's, Cuff Size: Adult Regular)   Pulse 60   Temp 97.5  F (36.4  C) (Oral)   Resp 14     No intake/output data recorded.    General: appropriately interactive, NAD, comfortable  Heart: regular rate, extremities warm and well-perfused  Lungs: breathing comfortably, symmetric chest rise  Abdomen: Soft, non-tender, non-distended; fundus firm and below umbilicus  Extremities: no edema in BLE    Labs:  Hemoglobin   Date Value Ref Range Status   2023 12.2 11.7 - 15.7 g/dL Final     Hemoglobin   Date Value Ref Range Status   2023 12.2 11.7 - 15.7 g/dL Final   2023 12.5 11.7 - 15.7 g/dL Final       Assessment/Plan: 32 year old  who is PPD#1 s/p  at [facility]. She was transferred here for escalation in NICU cares. Currently stable and doing well.    #Postpartum cares   - Continue routine post-partum cares.         - Heme: Hgb 12.2 > EBL 50>  Hemoglobin   Date Value Ref Range Status   2023 11.3 (L) 11.7 - 15.7 g/dL Final     - Baby:  In NICU, followed by SW, appreciate resources; working on pumping  - Contraception: plans to discuss at 6 week with OB   - Rh, positive. Rhogam not indicated   - Rubella immune  - Ppx: SCDs, encourage ambulation     Dispo: discharge to home likely PPD#2    Jennifer Kong MD MPH PGY-2  Obstetrics and Gynecology     [facility] staff:  Appreciate note by Dr. Octavia Kong.  I have seen and examined the patient without the resident. I have reviewed, edited, and agree with the note.      Vicky Vail MD,  FACOG  2/21/2023  4:35 PM

## 2023-02-21 NOTE — DISCHARGE SUMMARY
[facility] Discharge Summary    Kasie Ennis MRN# [id number]   Age: 32 year old YOB: 1990     Date of Admission:  2023  Date of Discharge::  2023  Admitting Physician:  Zina Nunes MD  Discharge Physician:  Vicky Vail            Admission Diagnoses:   -   - Postpartum, s/p delivery of viable infant  - PPD#0 from [facility] at outside hospital           Discharge Diagnosis:   - Same as above            Procedures:     Procedure(s): None               Medications Prior to Admission:     Medications Prior to Admission   Medication Sig Dispense Refill Last Dose     Prenatal Vit-Fe Fumarate-FA (PNV PRENATAL PLUS MULTIVITAMIN) 27-1 MG TABS per tablet Take 1 tablet by mouth daily                Discharge Medications:        Review of your medicines      UNREVIEWED medicines. Ask your doctor about these medicines      Dose / Directions   PNV Prenatal Plus Multivitamin 27-1 MG Tabs per tablet      Dose: 1 tablet  Take 1 tablet by mouth daily  Refills: 0                  Consultations:   NICU  Social Work  Anesthesia          Brief History of Admission and Labor:   Kasie Ennis is a 32 year old  who is PPD#0 from [facility], GEORGE from [facility] d/t [facility] of infant cares. Pregnancy and labor largely uncomplicated. Pregnancy notable for GBS+, cervical polyp, and BV. Labor was protracted with one fever to 100.8 but otherwise no evidence of Triple I. Infant required delivery of posterior arm for full delivery. Apgars were 1, 6, and 8 at 1, 5, and 10 min, respectively. Second degree laceration was sustained and repaired. Infant admitted to [facility] N[facility].           Postpartum Hospital Course:   The patient's postpartum course was unremarkable.  On discharge, her pain was well controlled. Vaginal bleeding is similar to peak menstrual flow. Anesthesia saw her on postpartum day one to check in after her epidural. Voiding without difficulty.  Ambulating well and tolerating a  normal diet.  No fever.  Pumping for infant. Infant in NICU at discharge. She was discharged on post-partum day #2.    Post-partum hemoglobin: 11.3    Contraception: plans to follow up with OB at 6 week postpartum visit     Rhogam was not indicated          Discharge Instructions and Follow-Up:     Discharge diet: Regular   Discharge activity: Activity as tolerated   Discharge follow-up: Follow up with your primary OBGYN provider in six weeks for a routine postpartum visit   Wound care: Drink plenty of fluids  Ice to area for comfort  Keep wound clean and dry              Discharge Disposition:     Discharged to home     Jennifer Kong MD MPH  OB/Gyn Resident PGY-2  2/22/2023  6:35 AM    Attestation:  I have reviewed today's vital signs, notes, medications, labs and imaging.    [facility] staff:  Appreciate note by Dr. Octavia Kong.  I have seen and examined the patient without the resident. I have reviewed, edited, and agree with the note.      Vicky Vail MD, FACOG  2/22/2023  11:55 AM

## 2023-02-21 NOTE — LACTATION NOTE
"This note was copied from a baby's chart.  D: Briefly met with Kasie. She was transferred here from [facility] and was at baby's bedside. She states she is normally in good health, takes no medications, and has no history of breast/chest surgery or trauma. This baby is her first child. She has already started to pump.   I: I gave her NICU guidebook and briefly highlighted lactation introductory materials; she plans to read through book in her room.  I explained how to access the videos \"Hand Expression\" and \"Maximizing Milk Production\"; as well as other helpful books and websites.  We discussed hands-on pumping techniques and usefulness of a hands-free pumping bra.  We discussed skin to skin holding and how to reach breastfeeding goals.  We talked about medications during breastfeeding.  We discussed maternal self care, lactogenic foods. She has a Spectra S1 pump through her insurance company.    A:  Mom has abbreviated information she needs to initiate her supply; will need more thorough admission.  P:  Will continue to provide lactation support.  Mckayla Burnett, RNC, IBCLC             "

## 2023-02-21 NOTE — ANESTHESIA POSTPROCEDURE EVALUATION
Patient: Kasie Ennis    Procedure: * No procedures listed *       Anesthesia Type:  Epidural    Note:  Disposition: Inpatient   Postop Pain Control: Uneventful            Sign Out: Well controlled pain   PONV: No   Neuro/Psych: Uneventful            Sign Out: Acceptable/Baseline neuro status   Airway/Respiratory: Uneventful            Sign Out: Acceptable/Baseline resp. status   CV/Hemodynamics: Uneventful            Sign Out: Acceptable CV status   Other NRE: NONE   DID A NON-ROUTINE EVENT OCCUR? No           Last vitals:  There were no vitals filed for this visit.    Electronically Signed By: Harpreet Hogue MD  February 21, 2023  6:27 AM

## 2023-02-21 NOTE — PLAN OF CARE
Goal Outcome Evaluation:  Patient is stable, denies pain and  moving well in the room. She takes motrin and tylenol  to be on top of her pain. Pumping for breast stimulation and visits baby in NICU  by wheelchair. Spouse a bedside and supportive of patient. Will continue with plan of care.

## 2023-02-21 NOTE — PLAN OF CARE
Data: Vital signs within normal limits. Postpartum checks within normal limits - see flow record. Fundus firm and midline, 1 cm below umbilicus. Patient eating and drinking normally. Patient able to empty bladder independently and is up ambulating. No apparent signs of infection. Patient performing self cares and is able to care for infant.    Action: Patient medicated during the shift for pain. See MAR. Patient reassessed within 1 hour after each medication and pain was improved - patient stated she was comfortable. Patient education done about medication management. See flow record.    Response: Infant in NICU but shows positive attachment to infant by going down to NICU and pumping to provide breast milk for baby.  present.    Plan: Continue to follow care plan.

## 2023-02-22 VITALS
TEMPERATURE: 98.2 F | SYSTOLIC BLOOD PRESSURE: 113 MMHG | DIASTOLIC BLOOD PRESSURE: 77 MMHG | RESPIRATION RATE: 16 BRPM | HEART RATE: 60 BPM

## 2023-02-22 PROCEDURE — 250N000013 HC RX MED GY IP 250 OP 250 PS 637: Performed by: STUDENT IN AN ORGANIZED HEALTH CARE EDUCATION/TRAINING PROGRAM

## 2023-02-22 RX ORDER — ACETAMINOPHEN 325 MG/1
650 TABLET ORAL EVERY 6 HOURS PRN
Qty: 100 TABLET | Refills: 0 | Status: SHIPPED | OUTPATIENT
Start: 2023-02-22

## 2023-02-22 RX ORDER — IBUPROFEN 600 MG/1
600 TABLET, FILM COATED ORAL EVERY 6 HOURS PRN
Qty: 60 TABLET | Refills: 0 | Status: SHIPPED | OUTPATIENT
Start: 2023-02-22

## 2023-02-22 RX ORDER — AMOXICILLIN 250 MG
1 CAPSULE ORAL DAILY
Qty: 100 TABLET | Refills: 0 | Status: SHIPPED | OUTPATIENT
Start: 2023-02-22

## 2023-02-22 RX ADMIN — IBUPROFEN 800 MG: 800 TABLET, FILM COATED ORAL at 02:00

## 2023-02-22 RX ADMIN — IBUPROFEN 800 MG: 800 TABLET, FILM COATED ORAL at 09:07

## 2023-02-22 RX ADMIN — ACETAMINOPHEN 650 MG: 325 TABLET ORAL at 01:59

## 2023-02-22 RX ADMIN — ACETAMINOPHEN 650 MG: 325 TABLET ORAL at 09:07

## 2023-02-22 RX ADMIN — DOCUSATE SODIUM 100 MG: 100 CAPSULE, LIQUID FILLED ORAL at 09:07

## 2023-02-22 ASSESSMENT — ACTIVITIES OF DAILY LIVING (ADL)
ADLS_ACUITY_SCORE: 18

## 2023-02-22 NOTE — PROGRESS NOTES
Postpartum Progress Note  Kasie Ennis  [id number]    Subjective:   Patient is feeling okay this AM.  Lochia like menses.  Eating and drinking regular food without nausea/emesis.  Ambulating without difficulty, lightheadedness. Pain is adequately controlled on PO medications. Had a backache overnight that improved with medications. Wondering about warning signs when she discharges home. All questions were answered. Some cramping with pumping. Baby in NICU. Voiding without difficulty.    Objective:  /72 (BP Location: Left arm, Patient Position: Semi-Kelley's, Cuff Size: Adult Regular)   Pulse 65   Temp 97.7  F (36.5  C) (Oral)   Resp 17     General: appropriately interactive, NAD, comfortable  Heart: regular rate, extremities warm and well-perfused  Lungs: breathing comfortably, symmetric chest rise  Abdomen: Soft, non-tender, non-distended; fundus firm and below umbilicus  Extremities: no edema in BLE    Labs:  Hemoglobin   Date Value Ref Range Status   2023 11.3 (L) 11.7 - 15.7 g/dL Final     Hemoglobin   Date Value Ref Range Status   2023 11.3 (L) 11.7 - 15.7 g/dL Final   2023 12.2 11.7 - 15.7 g/dL Final       Assessment/Plan: 32 year old  who is PPD#2 s/p  at [facility]. She was transferred here for escalation in NICU cares. Currently stable and doing well.    #Postpartum cares   - Continue routine post-partum cares.         - Heme: Hgb 12.2 > EBL 50> 11.3  Hemoglobin   Date Value Ref Range Status   2023 11.3 (L) 11.7 - 15.7 g/dL Final     - Baby:  In NICU, followed by SW, appreciate resources; working on pumping  - Contraception: plans to discuss at 6 week with OB   - Rh, positive. Rhogam not indicated   - Rubella immune  - Ppx: SCDs, encourage ambulation     Dispo: discharge to home today    Jennifer Kong MD MPH PGY-2  Obstetrics and Gynecology     [facility] staff:  Appreciate note by Dr. Octavia Kong.  I have seen and examined the patient  without the resident. I have reviewed, edited, and agree with the note.      Vicky Vail MD, FACOG  2/22/2023  11:53 AM

## 2023-02-22 NOTE — PLAN OF CARE
Goal Outcome Evaluation: Improving      Plan of Care Reviewed With: patient    Data: Vital signs and postpartum checks WDL  Patient eating and drinking normally. Patient able to empty bladder independently and is up ambulating.  Patient performing self cares and is able to visit and pump for infant in NICU.   Action: Patient medicated during the shift for pain with Tylenol and Ibuprofen with relief after 1 hour. Patient education done see education record.  Response:  Support persons  present.    Plan: Continue with the plan of care

## 2023-02-22 NOTE — PLAN OF CARE
Goal Outcome Evaluation:  Patient is stable and discharge to home today. Discharge education and instructions reviewed and questions were addressed.

## 2023-02-22 NOTE — PROVIDER NOTIFICATION
02/22/23 1226   Provider Notification   Provider Name/Title [facility]   Method of Notification Electronic Page   Request Evaluate-Remote   Notification Reason Medication Request     Order for discharge home meds ( motrin, tylenol and stool softener )

## 2023-02-22 NOTE — LACTATION NOTE
This note was copied from a baby's chart.  D:  I met with Kasie and Saúl to review admit info.  I:  I gave her introductory materials and went over pumping guidelines.  I reviewed use of the pump, cleaning, labeling, storing, and logging.   I explained how to access the videos on hand expression and hands-on pumping.  I helped her make a hands-free pumping bra.  We discussed skin to skin holding and how to reach your lactation goals.  We talked about medications during breastfeeding.  She verbalized understanding via teachback.  I called her postpartum RN and let them know she would like a rental pump.  I gave her more belly bands (she also plans to get an official hands-free pumping bra).  Saúl asked about special foods to eat; we discussed cultural lactogenic foods around the world.  A:  Mom has information she needs to initiate her supply.   P:  Will continue to provide lactation support.    Jen Yeung, RNC, IBCLC

## 2024-02-25 ENCOUNTER — HEALTH MAINTENANCE LETTER (OUTPATIENT)
Age: 34
End: 2024-02-25

## 2025-03-09 ENCOUNTER — HEALTH MAINTENANCE LETTER (OUTPATIENT)
Age: 35
End: 2025-03-09

## 2025-05-30 ENCOUNTER — OFFICE VISIT (OUTPATIENT)
Dept: FAMILY MEDICINE | Facility: CLINIC | Age: 35
End: 2025-05-30
Payer: COMMERCIAL

## 2025-05-30 VITALS
TEMPERATURE: 98.5 F | SYSTOLIC BLOOD PRESSURE: 99 MMHG | HEART RATE: 77 BPM | OXYGEN SATURATION: 100 % | DIASTOLIC BLOOD PRESSURE: 65 MMHG | HEIGHT: 62 IN | WEIGHT: 127.3 LBS | RESPIRATION RATE: 17 BRPM | BODY MASS INDEX: 23.42 KG/M2

## 2025-05-30 DIAGNOSIS — Z23 NEED FOR HEPATITIS B VACCINATION: ICD-10-CM

## 2025-05-30 DIAGNOSIS — Z00.00 ROUTINE GENERAL MEDICAL EXAMINATION AT A HEALTH CARE FACILITY: Primary | ICD-10-CM

## 2025-05-30 DIAGNOSIS — Z73.9 BURNOUT RELATED TO LIFE MANAGEMENT DIFFICULTY: ICD-10-CM

## 2025-05-30 PROCEDURE — 90471 IMMUNIZATION ADMIN: CPT

## 2025-05-30 PROCEDURE — 90746 HEPB VACCINE 3 DOSE ADULT IM: CPT

## 2025-05-30 PROCEDURE — 99385 PREV VISIT NEW AGE 18-39: CPT | Mod: 25

## 2025-05-30 PROCEDURE — 99213 OFFICE O/P EST LOW 20 MIN: CPT | Mod: 25

## 2025-05-30 PROCEDURE — 3078F DIAST BP <80 MM HG: CPT

## 2025-05-30 PROCEDURE — 3074F SYST BP LT 130 MM HG: CPT

## 2025-05-30 SDOH — HEALTH STABILITY: PHYSICAL HEALTH: ON AVERAGE, HOW MANY MINUTES DO YOU ENGAGE IN EXERCISE AT THIS LEVEL?: 0 MIN

## 2025-05-30 SDOH — HEALTH STABILITY: PHYSICAL HEALTH: ON AVERAGE, HOW MANY DAYS PER WEEK DO YOU ENGAGE IN MODERATE TO STRENUOUS EXERCISE (LIKE A BRISK WALK)?: 0 DAYS

## 2025-05-30 ASSESSMENT — ANXIETY QUESTIONNAIRES
6. BECOMING EASILY ANNOYED OR IRRITABLE: NEARLY EVERY DAY
2. NOT BEING ABLE TO STOP OR CONTROL WORRYING: MORE THAN HALF THE DAYS
3. WORRYING TOO MUCH ABOUT DIFFERENT THINGS: MORE THAN HALF THE DAYS
5. BEING SO RESTLESS THAT IT IS HARD TO SIT STILL: SEVERAL DAYS
1. FEELING NERVOUS, ANXIOUS, OR ON EDGE: MORE THAN HALF THE DAYS
GAD7 TOTAL SCORE: 12
GAD7 TOTAL SCORE: 12
7. FEELING AFRAID AS IF SOMETHING AWFUL MIGHT HAPPEN: NOT AT ALL

## 2025-05-30 ASSESSMENT — SOCIAL DETERMINANTS OF HEALTH (SDOH): HOW OFTEN DO YOU GET TOGETHER WITH FRIENDS OR RELATIVES?: ONCE A WEEK

## 2025-05-30 ASSESSMENT — PATIENT HEALTH QUESTIONNAIRE - PHQ9
SUM OF ALL RESPONSES TO PHQ QUESTIONS 1-9: 9
5. POOR APPETITE OR OVEREATING: MORE THAN HALF THE DAYS
SUM OF ALL RESPONSES TO PHQ QUESTIONS 1-9: 9
10. IF YOU CHECKED OFF ANY PROBLEMS, HOW DIFFICULT HAVE THESE PROBLEMS MADE IT FOR YOU TO DO YOUR WORK, TAKE CARE OF THINGS AT HOME, OR GET ALONG WITH OTHER PEOPLE: SOMEWHAT DIFFICULT

## 2025-05-30 NOTE — PATIENT INSTRUCTIONS
[employer]   - can find therapists and filter based on insurance and preferences, able to read personal bios    Patient Education   Preventive Care Advice   This is general advice given by our system to help you stay healthy. However, your care team may have specific advice just for you. Please talk to your care team about your preventive care needs.  Nutrition  Eat 5 or more servings of fruits and vegetables each day.  Try wheat bread, brown rice and whole grain pasta (instead of white bread, rice, and pasta).  Get enough calcium and vitamin D. Check the label on foods and aim for 100% of the RDA (recommended daily allowance).  Lifestyle  Exercise at least 150 minutes each week  (30 minutes a day, 5 days a week).  Do muscle strengthening activities 2 days a week. These help control your weight and prevent disease.  No smoking.  Wear sunscreen to prevent skin cancer.  Have a dental exam and cleaning every 6 months.  Yearly exams  See your health care team every year to talk about:  Any changes in your health.  Any medicines your care team has prescribed.  Preventive care, family planning, and ways to prevent chronic diseases.  Shots (vaccines)   HPV shots (up to age 26), if you've never had them before.  Hepatitis B shots (up to age 59), if you've never had them before.  COVID-19 shot: Get this shot when it's due.  Flu shot: Get a flu shot every year.  Tetanus shot: Get a tetanus shot every 10 years.  Pneumococcal, hepatitis A, and RSV shots: Ask your care team if you need these based on your risk.  Shingles shot (for age 50 and up)  General health tests  Diabetes screening:  Starting at age 35, Get screened for diabetes at least every 3 years.  If you are younger than age 35, ask your care team if you should be screened for diabetes.  Cholesterol test: At age 39, start having a cholesterol test every 5 years, or more often if advised.  Bone density scan (DEXA): At age 50, ask your care team if you should  have this scan for osteoporosis (brittle bones).  Hepatitis C: Get tested at least once in your life.  STIs (sexually transmitted infections)  Before age 24: Ask your care team if you should be screened for STIs.  After age 24: Get screened for STIs if you're at risk. You are at risk for STIs (including HIV) if:  You are sexually active with more than one person.  You don't use condoms every time.  You or a partner was diagnosed with a sexually transmitted infection.  If you are at risk for HIV, ask about PrEP medicine to prevent HIV.  Get tested for HIV at least once in your life, whether you are at risk for HIV or not.  Cancer screening tests  Cervical cancer screening: If you have a cervix, begin getting regular cervical cancer screening tests starting at age 21.  Breast cancer scan (mammogram): If you've ever had breasts, begin having regular mammograms starting at age 40. This is a scan to check for breast cancer.  Colon cancer screening: It is important to start screening for colon cancer at age 45.  Have a colonoscopy test every 10 years (or more often if you're at risk) Or, ask your provider about stool tests like a FIT test every year or Cologuard test every 3 years.  To learn more about your testing options, visit:   .  For help making a decision, visit:   https://bit.ly/va22015.  Prostate cancer screening test: If you have a prostate, ask your care team if a prostate cancer screening test (PSA) at age 55 is right for you.  Lung cancer screening: If you are a current or former smoker ages 50 to 80, ask your care team if ongoing lung cancer screenings are right for you.  For informational purposes only. Not to replace the advice of your health care provider. Copyright   2023 [employer]. All rights reserved. Clinically reviewed by the [facility]. [employer] [id number] - REV 01/24.  Learning About Stress  What is stress?     Stress is your body's response to a hard  situation. Your body can have a physical, emotional, or mental response. Stress is a fact of life for most people, and it affects everyone differently. What causes stress for you may not be stressful for someone else.  A lot of things can cause stress. You may feel stress when you go on a job interview, take a test, or run a race. This kind of short-term stress is normal and even useful. It can help you if you need to work hard or react quickly. For example, stress can help you finish an important job on time.  Long-term stress is caused by ongoing stressful situations or events. Examples of long-term stress include long-term health problems, ongoing problems at work, or conflicts in your family. Long-term stress can harm your health.  How does stress affect your health?  When you are stressed, your body responds as though you are in danger. It makes hormones that speed up your heart, make you breathe faster, and give you a burst of energy. This is called the fight-or-flight stress response. If the stress is over quickly, your body goes back to normal and no harm is done.  But if stress happens too often or lasts too long, it can have bad effects. Long-term stress can make you more likely to get sick, and it can make symptoms of some diseases worse. If you tense up when you are stressed, you may develop neck, shoulder, or low back pain. Stress is linked to high blood pressure and heart disease.  Stress also harms your emotional health. It can make you mcgill, tense, or depressed. Your relationships may suffer, and you may not do well at work or school.  What can you do to manage stress?  You can try these things to help manage stress:   Do something active. Exercise or activity can help reduce stress. Walking is a great way to get started. Even everyday activities such as housecleaning or yard work can help.  Try yoga or roman chi. These techniques combine exercise and meditation. You may need some training at first to  learn them.  Do something you enjoy. For example, listen to music or go to a movie. Practice your hobby or do volunteer work.  Meditate. This can help you relax, because you are not worrying about what happened before or what may happen in the future.  Do guided imagery. Imagine yourself in any setting that helps you feel calm. You can use online videos, books, or a teacher to guide you.  Do breathing exercises. For example:  From a standing position, bend forward from the waist with your knees slightly bent. Let your arms dangle close to the floor.  Breathe in slowly and deeply as you return to a standing position. Roll up slowly and lift your head last.  Hold your breath for just a few seconds in the standing position.  Breathe out slowly and bend forward from the waist.  Let your feelings out. Talk, laugh, cry, and express anger when you need to. Talking with supportive friends or family, a counselor, or a ankit leader about your feelings is a healthy way to relieve stress. Avoid discussing your feelings with people who make you feel worse.  Write. It may help to write about things that are bothering you. This helps you find out how much stress you feel and what is causing it. When you know this, you can find better ways to cope.  What can you do to prevent stress?  You might try some of these things to help prevent stress:  Manage your time. This helps you find time to do the things you want and need to do.  Get enough sleep. Your body recovers from the stresses of the day while you are sleeping.  Get support. Your family, friends, and community can make a difference in how you experience stress.  Limit your news feed. Avoid or limit time on social media or news that may make you feel stressed.  Do something active. Exercise or activity can help reduce stress. Walking is a great way to get started.  Where can you learn more?  Go to https://www.healthwise.net/patiented  Enter N032 in the search box to learn more  "about \"Learning About Stress.\"  Current as of: October 24, 2024  Content Version: 14.4    4896-5834 [employer].   Care instructions adapted under license by your healthcare professional. If you have questions about a medical condition or this instruction, always ask your healthcare professional. [employer] disclaims any warranty or liability for your use of this information.    Learning About Depression Screening  What is depression screening?  Depression screening is a way to see if you have depression symptoms. It may be done by a doctor or counselor. It's often part of a routine checkup. That's because your mental health is just as important as your physical health.  Depression is a mental health condition that affects how you feel, think, and act. You may:  Have less energy.  Lose interest in your daily activities.  Feel sad and grouchy for a long time.  Depression is very common. It affects people of all ages.  Many things can lead to depression. Some people become depressed after they have a stroke or find out they have a major illness like cancer or heart disease. The death of a loved one or a breakup may lead to depression. It can run in families. Most experts believe that a combination of inherited genes and stressful life events can cause it.  What happens during screening?  You may be asked to fill out a form about your depression symptoms. You and the doctor will discuss your answers. The doctor may ask you more questions to learn more about how you think, act, and feel.  What happens after screening?  If you have symptoms of depression, your doctor will talk to you about your options.  Doctors usually treat depression with medicines or counseling. Often, combining the two works best. Many people don't get help because they think that they'll get over the depression on their own. But people with depression may not get better unless they get treatment.  The cause of depression is " "not well understood. There may be many factors involved. But if you have depression, it's not your fault.  A serious symptom of depression is thinking about death or suicide. If you or someone you care about talks about this or about feeling hopeless, get help right away.  It's important to know that depression can be treated. Medicine, counseling, and self-care may help.  Where can you learn more?  Go to https://www.[employer].net/patiented  Enter T185 in the search box to learn more about \"Learning About Depression Screening.\"  Current as of: July 31, 2024  Content Version: 14.4    [id number] [employer].   Care instructions adapted under license by your healthcare professional. If you have questions about a medical condition or this instruction, always ask your healthcare professional. [employer] disclaims any warranty or liability for your use of this information.       "

## 2025-05-30 NOTE — PROGRESS NOTES
Preventive Care Visit  [facility]  SHANIKA Hernandez CNP, Nurse Practitioner Primary Care  May 30, 2025    Assessment & Plan     Routine general medical examination at a health care facility  Health maintenance updated. Pt will schedule pap only appt    Burnout related to life management difficulty  Pt reports difficulty with mental health regarding work. Feels burned out and has come close to quitting job. She states she spoke with her job and they encouraged her to go on FMLA for 6 weeks, just needs paperwork filled out. Has not started therapy but plans to. She is not currently on any medications for mental health and has not been in the past. Patient relatively vague when trying to get more information for necessity of leave. Would recommend a mental health provider fill out the paperwork to support her medical leave for further assessment and recommendations.  - Adult Mental Health  Referral; Future    Need for hepatitis B vaccination  - HEPATITIS B, ADULT 20+ (ENGERIX-B/RECOMBIVAX HB)            Counseling  Appropriate preventive services were addressed with this patient via screening, questionnaire, or discussion as appropriate for fall prevention, nutrition, physical activity, Tobacco-use cessation, social engagement, weight loss and cognition.  Checklist reviewing preventive services available has been given to the patient.  Reviewed patient's diet, addressing concerns and/or questions.   The patient was instructed to see the dentist every 6 months.   She is at risk for psychosocial distress and has been provided with information to reduce risk.   The patient's PHQ-9 score is consistent with mild depression. She was provided with information regarding depression.       Follow-up   No follow-ups on file.     Follow-up Visit   Expected date:  May 30, 2026 (Approximate)      Follow Up Appointment Details:     Follow-up with whom?: PCP    Follow-Up for what?: Adult  Preventive    How?: In Person                 Jesus Ferro is a 34 year old, presenting for the following:  Physical and Health Maintenance (Declines PAP)        5/30/2025     1:47 PM   Additional Questions   Roomed by Akua Boles          HPI       FMLA for 6 weeks  1 y/o - never got maternity leave    Feels Mentally burned out  Works for the [employer] as . States they said she can go on FMLA for 6 weeks, just needs paperwork. Paperwork is due next week. Date due?  Time requested off - 5/27 -7/8  Plans to start therapy but still looking for someone    States she had labs for life insurance and Cholesterol reportedly normal in Jan/Feb    On period currently. No history of abnormal pap before, however this is her first checkup since 2010 5/30/2025     2:44 PM   GIOVANNA-7 SCORE   Total Score 12            5/30/2025     1:42 PM 5/30/2025     2:44 PM   PHQ   PHQ-9 Total Score 9  8   Q9: Thoughts of better off dead/self-harm past 2 weeks Not at all Not at all       Patient-reported            5/30/2025   General Health   How would you rate your overall physical health? (!) FAIR   Feel stress (tense, anxious, or unable to sleep) Rather much   (!) STRESS CONCERN      5/30/2025   Nutrition   Three or more servings of calcium each day? (!) I DON'T KNOW   Diet: Regular (no restrictions)   How many servings of fruit and vegetables per day? (!) 0-1   How many sweetened beverages each day? 0-1         5/30/2025   Exercise   Days per week of moderate/strenous exercise 0 days   Average minutes spent exercising at this level 0 min   (!) EXERCISE CONCERN        5/30/2025   Social Factors   Frequency of gathering with friends or relatives Once a week   Worry food won't last until get money to buy more No   Food not last or not have enough money for food? No   Do you have housing? (Housing is defined as stable permanent housing and does not include staying outside in a car, in a tent, in an abandoned  "building, in an overnight shelter, or couch-surfing.) Yes   Are you worried about losing your housing? No   Lack of transportation? No   Unable to get utilities (heat,electricity)? No         5/30/2025   Dental   Dentist two times every year? (!) NO       Today's PHQ-9 Score:       5/30/2025     2:44 PM   PHQ-9 SCORE   PHQ-9 Total Score 8         5/30/2025   Substance Use   Alcohol more than 3/day or more than 7/wk No   Do you use any other substances recreationally? No     Social History     Tobacco Use    Smoking status: Never    Smokeless tobacco: Never   Vaping Use    Vaping status: Never Used   Substance Use Topics    Alcohol use: Yes     Alcohol/week: 4.0 standard drinks of alcohol     Types: 2 Glasses of wine, 2 Cans of beer per week     Comment: monthly or less    Drug use: No     Mammogram Screening - Patient under 40 years of age: Routine Mammogram Screening not recommended.         5/30/2025   STI Screening   New sexual partner(s) since last STI/HIV test? (!) DECLINE     History of abnormal Pap smear: No - age 30- 64 PAP with HPV every 5 years recommended             5/30/2025   Contraception/Family Planning   Questions about contraception or family planning No     Reviewed and updated as needed this visit by Provider   Tobacco   Meds   Med Hx  Surg Hx  Fam Hx  Soc Hx Sexual Activity          History reviewed. No pertinent past medical history.         Objective    Exam  BP 99/65 (BP Location: Right arm, Patient Position: Sitting, Cuff Size: Adult Regular)   Pulse 77   Temp 98.5  F (36.9  C) (Oral)   Resp 17   Ht 1.575 m (5' 2\")   Wt 57.7 kg (127 lb 4.8 oz)   LMP 05/29/2025 (Exact Date)   SpO2 100%   Breastfeeding No   BMI 23.28 kg/m     Estimated body mass index is 23.28 kg/m  as calculated from the following:    Height as of this encounter: 1.575 m (5' 2\").    Weight as of this encounter: 57.7 kg (127 lb 4.8 oz).    Physical Exam  GENERAL: alert and no distress  EYES: Eyes grossly normal " to inspection, and conjunctivae and sclerae normal  HENT: ear canals and TM's normal, and mouth without ulcers or lesions  NECK: no adenopathy, no asymmetry, masses, or scars  RESP: lungs clear to auscultation - no rales, rhonchi or wheezes  CV: regular rate and rhythm, normal S1 S2, no S3 or S4, no murmur, click or rub, no peripheral edema  ABDOMEN: soft, nontender, no masses and bowel sounds normal  MS: no gross musculoskeletal defects noted, no edema  PSYCH: affect flat, difficulty word finding        Signed Electronically by: SHANIKA Hernandez CNP    Answers submitted by the patient for this visit:  Patient Health Questionnaire (Submitted on 5/30/2025)  If you checked off any problems, how difficult have these problems made it for you to do your work, take care of things at home, or get along with other people?: Somewhat difficult  PHQ9 TOTAL SCORE: 9

## 2025-06-02 ENCOUNTER — PATIENT OUTREACH (OUTPATIENT)
Dept: CARE COORDINATION | Facility: CLINIC | Age: 35
End: 2025-06-02
Payer: COMMERCIAL

## 2025-06-02 ENCOUNTER — TELEPHONE (OUTPATIENT)
Dept: FAMILY MEDICINE | Facility: CLINIC | Age: 35
End: 2025-06-02
Payer: COMMERCIAL

## 2025-06-02 NOTE — TELEPHONE ENCOUNTER
Please call patient re: FMLA forms     I am not able to fill out her FMLA forms, I don't have enough information to justify the FMLA and am not treating her mental health. I would recommend seeing if she can get a therapist or psych provider to fill them out. Given what she mentioned during the visit, it should be filled out by a mental health provider.     Thanks  Hailee Macias, APRN, CNP

## 2025-06-03 NOTE — TELEPHONE ENCOUNTER
RN called pt and relayed message- pt verbalized understanding.   Pt has upcoming therapy appt this week and will discuss then.     Avani ALONSO RN  [facility]

## 2025-06-05 ENCOUNTER — VIRTUAL VISIT (OUTPATIENT)
Dept: BEHAVIORAL HEALTH | Facility: CLINIC | Age: 35
End: 2025-06-05
Payer: COMMERCIAL

## 2025-06-05 DIAGNOSIS — Z73.9 BURNOUT RELATED TO LIFE MANAGEMENT DIFFICULTY: ICD-10-CM

## 2025-06-05 ASSESSMENT — PATIENT HEALTH QUESTIONNAIRE - PHQ9
10. IF YOU CHECKED OFF ANY PROBLEMS, HOW DIFFICULT HAVE THESE PROBLEMS MADE IT FOR YOU TO DO YOUR WORK, TAKE CARE OF THINGS AT HOME, OR GET ALONG WITH OTHER PEOPLE: SOMEWHAT DIFFICULT
SUM OF ALL RESPONSES TO PHQ QUESTIONS 1-9: 9

## 2025-06-05 NOTE — PROGRESS NOTES
[facility]: Integrated Behavioral Health    Integrated Behavioral Health   Mental Health & Addiction Services      Progress Note - Initial [facility] Visit     Patient Name: Kasie Ennis    Date: 2025  Service Type: Individual   Visit Start Time: 11:04 AM  Visit End Time:  11:34 AM   Attendees: Client   Service Modality: Video Visit:      Provider verified identity through the following two step process.  Patient provided:  Patient  and Patient address    Telemedicine Visit: The patient's condition can be safely assessed and treated via synchronous audio and visual telemedicine encounter.      Reason for Telemedicine Visit: Patient has requested telehealth visit    Originating Site (Patient Location): Patient's home    [place] [facility] [place]:  The patient/guardian has verbally consented to: the potential risks and benefits of telemedicine (video visit) versus in person care; bill my insurance or make self-payment for services provided; and responsibility for payment of non-covered services.     Patient would like the video invitation sent by:  My Chart    Mode of Communication:  Video Conference via [facility]    Distant Location (Provider):  On-site    As the provider I attest to compliance with applicable laws and regulations related to telemedicine.     [facility] Visit Activities (Refresh list every visit): NEW         DATA:     Interactive Complexity: No   Crisis: No     Assessments completed:     The following assessments were completed by patient for this visit:  PHQ9:       2025     1:42 PM 2025     2:44 PM 2025    10:50 AM   PHQ-9 SCORE   PHQ-9 Total Score MyChart 9 (Mild depression)  9 (Mild depression)   PHQ-9 Total Score 9  8 9        Patient-reported     GAD7:       2025     2:44 PM   GIOVANNA-7 SCORE   Total Score 12         PROMIS 10-Global Health (all questions and answers displayed):       2025     10:51 AM   PROMIS 10   In general, would you say your health is: Good   In general, would you say your quality of life is: Good   In general, how would you rate your physical health? Good   In general, how would you rate your mental health, including your mood and your ability to think? Fair   In general, how would you rate your satisfaction with your social activities and relationships? Poor   In general, please rate how well you carry out your usual social activities and roles Poor   To what extent are you able to carry out your everyday physical activities such as walking, climbing stairs, carrying groceries, or moving a chair? Not at all   In the past 7 days, how often have you been bothered by emotional problems such as feeling anxious, depressed, or irritable? Often   In the past 7 days, how would you rate your fatigue on average? Severe   In the past 7 days, how would you rate your pain on average, where 0 means no pain, and 10 means worst imaginable pain? 6   In general, would you say your health is: 3   In general, would you say your quality of life is: 3   In general, how would you rate your physical health? 3   In general, how would you rate your mental health, including your mood and your ability to think? 2   In general, how would you rate your satisfaction with your social activities and relationships? 1   In general, please rate how well you carry out your usual social activities and roles. (This includes activities at home, at work and in your community, and responsibilities as a parent, child, spouse, employee, friend, etc.) 1   To what extent are you able to carry out your everyday physical activities such as walking, climbing stairs, carrying groceries, or moving a chair? 1   In the past 7 days, how often have you been bothered by emotional problems such as feeling anxious, depressed, or irritable? 4   In the past 7 days, how would you rate your fatigue on average? 4   In the past 7 days, how  would you rate your pain on average, where 0 means no pain, and 10 means worst imaginable pain? 6   Global Mental Health Score 8    Global Physical Health Score 9    PROMIS TOTAL - SUBSCORES 17        Patient-reported     [place] Suicide Severity Rating Scale (Lifetime/Recent)      6/5/2025    11:37 AM   [place] Suicide Severity Rating (Lifetime/Recent)   1. Wish to be Dead (Lifetime) N   1. Wish to be Dead (Past 1 Month) N   2. Non-Specific Active Suicidal Thoughts (Lifetime) N   2. Non-Specific Active Suicidal Thoughts (Past 1 Month) N   3. Active Suicidal Ideation with any Methods (Not Plan) Without Intent to Act (Lifetime) N   3. Active Suicidal Ideation with any Methods (Not Plan) Without Intent to Act (Past 1 Month) N   4. Active Suicidal Ideation with Some Intent to Act, Without Specific Plan (Lifetime) N   4. Active Suicidal Ideation with Some Intent to Act, Without Specific Plan (Past 1 Month) N   5. Active Suicidal Ideation with Specific Plan and Intent (Lifetime) N   5. Active Suicidal Ideation with Specific Plan and Intent (Past 1 Month) N   Actual Attempt (Lifetime) N   Has subject engaged in non-suicidal self-injurious behavior? (Lifetime) N   Interrupted Attempts (Lifetime) N   Aborted or Self-Interrupted Attempt (Lifetime) N   Preparatory Acts or Behavior (Lifetime) N   Calculated C-SSRS Risk Score (Lifetime/Recent) No Risk Indicated        Referral:   Patient was referred to [facility] by primary care provider.    Reason for referral: determine behavioral health treatment options.      [facility] introduced self and role. Discussed informed consent and limits to confidentiality.     Presenting Concerns/ Current Stressors:   Pt is looking for FMLA paperwork and is currently on second week of FMLA (going back July 8th).  PCP would not fill out due to cause of FMLA is behavioral/MH related.    Pt is feeling burned out from work and home life.  Partner has to travel a lot leaving her home alone and she hasn't taken  any time off work since her maternity.   Pt is feeling some anxiety, rush in the brain and will feel like she has to over explain things. Eating when she isn't hungry.    Pt is doing more than her required job duties due to restructuring at work. Difficult to take time off due to coverages.  Pt isn't paying much attention to her health.  Is hoping to slow down, think for self, see friends and family, change pace of life.  Pt is looking at what she wants to do with her career.  Pt wants to be in a relaxed state.  Pt did not feel like herself after birth.  No hx of medications.      Therapeutic Interventions:  Motivational Interviewing (MI): Validated patient's thoughts, feelings and experience. Expressed respect for patient's autonomy in decision making.  Asked open-ended questions to invite patient's self-reflection and self-direction around change and what is important for them in working towards their goals.  Expressed and demonstrated empathy through reflective listening.   Psycho-education: Provided psycho-education about patient's behavioral health condition and symptoms. Explained and reviewed treatment options.    Response to treatment interventions:   Patient was receptive to interventions utilized.  Patient was engaged in the therapy process.      Safety Issues and Plan for Safety and Risk Management:     Patient denies a history of suicidal ideation, suicide attempts, self-injurious behavior, homicidal ideation, homicidal behavior, and and other safety concerns   Patient denies current fears or concerns for personal safety.   Patient denies current or recent suicidal ideation or behaviors.   Patient denies current or recent homicidal ideation or behaviors.   Patient denies current or recent self injurious behavior or ideation.   Patient denies other safety concerns.   Recommended that patient call 911 or go to the local ED should there be a change in any of these risk factors   Patient reports there are no  firearms in the house.       ASSESSMENT:   Mental Status:     Appearance:   Appropriate    Eye Contact:   Good    Psychomotor Behavior: Normal    Attitude:   Interested Friendly Pleasant   Orientation:   All   Speech Rate / Production: Normal/ Responsive   Volume:   Normal    Mood:    Anxious  Depressed  Sad    Affect:    Appropriate    Thought Content:  Clear    Thought Form:  Goal Directed  Logical    Insight:    Good         Diagnostic Criteria:   Adjustment Disorder  A. The development of emotional or behavioral symptoms in response to an identifiable stressor(s) occurring within 3 months of the onset of the stressor(s)  B. These symptoms or behaviors are clinically significant, as evidenced by one or both of the following:       - Marked distress that is out of proportion to the severity/intensity of the stressor (with consideration for external context & culture)  C. The stress-related disturbance does not meet criteria for another disorder & is not not an exacerbation of another mental disorder  D. The symptoms do not represent normal bereavement  E. Once the stressor or its consequences have terminated, the symptoms do not persist for more than an additional 6 months       * Adjustment Disorder with Mixed Anxiety and Depressed Mood: The predominant manifestation is a combination of depression and anxiety        DSM5 Diagnoses: (Sustained by DSM5 Criteria Listed Above)     Diagnoses: Adjustment Disorders  309.28 (F43.23) With mixed anxiety and depressed mood     Psychosocial / Contextual Factors: Occupational Issues and postpartum 10 months       Collateral Reports Completed:   Not Applicable        PLAN: (Homework, other):     1. Patient was provided:  recommendation to schedule follow-up with [facility] [place]     2. Provider recommended the following referrals: TBD.        3. Suicide Risk and Safety Concerns were assessed for Kasie Ennis    Safety Plan:   Patient denied any current/recent/lifetime history of suicidal  ideation and/or behaviors. Recommended that patient call 911 or go to the local ED should there be a change in any of these risk factors       ROSALINDA Suresh, [facility]   June 5, 2025

## 2025-06-10 ENCOUNTER — PATIENT OUTREACH (OUTPATIENT)
Dept: CARE COORDINATION | Facility: CLINIC | Age: 35
End: 2025-06-10
Payer: COMMERCIAL

## 2025-06-10 NOTE — PROGRESS NOTES
Clinic Care Coordination Contact  Community Health Worker Initial Outreach    CHW huddled with [facility] and PCP prior to calling patient. Neither are able to fill out forms for FMLA, [facility] will place new MH referral to have patient get established with long term therapist who may be able to fill out forms.     CHW spoke with patient, she states that she has extended the submission of forms for a few more days, she was able to take FMLA for 6 weeks before having forms filled out. CHW explained the difficulty due to patient not previously being established with PCP or MH provider prior to leave.   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   CHW inquired if patient is in need of any additional support or resources however patient declines.  CHW provided contact information and encouraged patient to reach out with any future needs or concerns, patient agrees.    Patient accepts CC: No, patient has no further needs for CC at this time. Patient will be sent Care Coordination introduction letter for future reference.     Harmony GODOY, B.S. [facility] Care Coordination  [facility]:  [place], [place] and [place]  [phone]  [email or web address]

## 2025-06-10 NOTE — LETTER
[facility] CARE COORDINATION  [id number]  [place] [address]    Shanique 10, 2025    Kasie Ennis  [address] [address] [facility] [address]      Dear Kasie,    I am a clinic community health worker who works with SHANIKA Hernandez CNP with the [facility]. I wanted to thank you for spending the time to talk with me.  Below is a description of clinic care coordination and how I can further assist you.       The clinic care coordination team is made up of a registered nurse, , financial resource worker and community health worker who understand the health care system. The goal of clinic care coordination is to help you manage your health and improve access to the health care system. Our team works alongside your provider to assist you in determining your health and social needs. We can help you obtain health care and community resources, providing you with necessary information and education. We can work with you through any barriers and develop a care plan that helps coordinate and strengthen the communication between you and your care team.  Our services are voluntary and are offered without charge to you personally.    Please feel free to contact me with any questions or concerns regarding care coordination and what we can offer.      We are focused on providing you with the highest-quality healthcare experience possible.    Sincerely,     Harmony Irwin TODD, B.S. Public Health  Clinic Care Coordination  [facility]:  [place]  [phone]  [email or web address]

## 2025-06-11 ENCOUNTER — PATIENT OUTREACH (OUTPATIENT)
Dept: CARE COORDINATION | Facility: CLINIC | Age: 35
End: 2025-06-11
Payer: COMMERCIAL